# Patient Record
Sex: FEMALE | Race: WHITE | NOT HISPANIC OR LATINO
[De-identification: names, ages, dates, MRNs, and addresses within clinical notes are randomized per-mention and may not be internally consistent; named-entity substitution may affect disease eponyms.]

---

## 2021-07-06 PROBLEM — Z00.00 ENCOUNTER FOR PREVENTIVE HEALTH EXAMINATION: Status: ACTIVE | Noted: 2021-07-06

## 2021-07-09 ENCOUNTER — NON-APPOINTMENT (OUTPATIENT)
Age: 46
End: 2021-07-09

## 2021-07-09 ENCOUNTER — APPOINTMENT (OUTPATIENT)
Dept: PAIN MANAGEMENT | Facility: CLINIC | Age: 46
End: 2021-07-09
Payer: MEDICARE

## 2021-07-09 VITALS
SYSTOLIC BLOOD PRESSURE: 133 MMHG | HEART RATE: 80 BPM | HEIGHT: 57 IN | WEIGHT: 112 LBS | BODY MASS INDEX: 24.16 KG/M2 | DIASTOLIC BLOOD PRESSURE: 84 MMHG

## 2021-07-09 PROCEDURE — 99204 OFFICE O/P NEW MOD 45 MIN: CPT

## 2021-07-09 PROCEDURE — 99072 ADDL SUPL MATRL&STAF TM PHE: CPT

## 2021-07-09 NOTE — ASSESSMENT
[FreeTextEntry1] : I personally reviewed the relevant imaging.  Discussed and explained to patient the likely source of pathology and pain.  Questions answered.\par \par persistent pain secondary to lumbosacral stenosis and postlaminectomy p ain demonstrated on imaging refractory to conservative treatments, will schedule caudal epidural steroid injection r/b/a discussed\par \par informed patient of risks of steroid administration including transient worsening of blood glucose, htn, mood changes, progressive osteoporosis.  Encouraged to call with questions and concerns.\par \par Will schedule above interventional pain procedure because further delay may cause harm or negative outcome to patient.  The goal in performing this procedure is to avoid deterioration of function, emergency room visits (which increases exposure) and reliance on opioids.  \par \par r/b/a discussed with patient, lack of evidence to conclusively determine whether pain management procedures have any positive or negative impact on the possibility of wally the virus and/or development of any sequelae. \par \par Patient counselled regarding timing steroid based intervention 2 weeks before or after COVID-19 vaccine administration to avoid any interaction or affect on efficacy of vaccination\par \par Patient demonstrates understanding\par \par Informed patient that risks associated with the COVID-19 infection.  Informed patient steps taken to limit the risks.  We are implementing safety precautions and following protocols consistent with the CDC and state recommendations. All patients and staff will be checked for fever or signs of illness upon entry to the facility. We will limit our steroid dose to the lowest effective therapeutic dose or in some cases steroids will not be injected at all. \par \par Patient agrees to proceed\par \par May consider scs trial for postlaminectomy pain\par \par neurology - establish care for hx of neurofibromatosis\par \par The above diagnosis and treatment plan is medically reasonable and necessary based on the patient encounter.\par \par Regarding opiate medication to manage pain. I had a detailed discussion with the patient regarding the risks of long-term opioid use, including the potential for medication side effects, hyperaglesia, endocrine dysfunction,  Encouraged weaning with assistance of current prescriber.\par \par There were no barriers to communication.\par Informed patient that I would be available for any additional questions.\par Patient was instructed to call with any worsening symptoms including severe pain, new numbness/weakness, or changes in the bowel/bladder function. \par  \par Discussed role of nsaids in pain management and all relevant risks, if patient is continuing to require after 4 weeks the patient should f/u for alternative treatment. \par \par Instructed patient to maintain pain diary to monitor pain level, mobility, and function.\par \par The referring provider was informed of the above diagnosis and treatment plan.\par

## 2021-07-09 NOTE — REASON FOR VISIT
[Initial Consultation] : an initial pain management consultation [FreeTextEntry2] : referred by Dr. Nayak for evaluation of back pain

## 2021-07-09 NOTE — HISTORY OF PRESENT ILLNESS
[Back Pain] : back pain [___ yrs] : [unfilled] year(s) ago [7] : an average pain level of 7/10 [10] : a maximum pain level of 10/10 [Transitioning] : transitioning [Medications] : medications [FreeTextEntry1] : HPI\par \par Ms. MAURO SCHNEIDER is a 45 year F with pmhx of neurofibromatosis with neurofibroma sp laminectomy, decompression and fusion presents with bilateral lower back pain radiating to bilateral subchondral, and lower back pain radiating to left anterior thigh.  Pain is so bad that patient finds it difficult to perform adls and ambulate. denies any worsening numbness, weakness, bowel/bladder dysfunction.  \par \par \par Previous and current pain medications/doses/effects:\par \par percocet 5/325 \par Medical cannabis \par Tizanidine prn spasm\par cymbalta \par \par Previous Pain Treatments:\par \par PT without improvement\par \par Previous Pain Injections:\par \par \par Previous Diagnostic Studies/Images:\par \par \par MRI LS 3/20\par \par Patient status post laminectomies from T11-L2. Previous study \par  demonstrated screws extending from the left side through vertebral bodies at \par  T11 to L2. A plate connected the screws. That finding is again demonstrated. \par  Since the previous study, right sided pedicle screws have been placed at L2, \par  L3 and L4. Vertebral disc cages are now demonstrated at L2-L3 and L3-L4. There \par  is extensive metallic artifact from hardware. The artifact from the cage at \par  L3-L4 obscures the ventral aspect of the spinal canal. The artifact from the \par  pedicle screw or the pedicle screw at L3 appears to extend medial to the \par  pedicle into the right side of the spinal canal. Again demonstrated is grade 2 \par  retrolisthesis at L2-L3. \par \par  INTRADURAL SPACE: Again demonstrated is an ectatic spinal canal at all levels \par  from T11 to L3. Canal is wide in AP diameter as well as in width. Wide canal \par  expands neural foramina at multiple levels. Again demonstrated is erosion of \par  the dorsal aspect of the L3 vertebral body. Wide canal could represent \par  congenital dural ectasia/meningoceles and/or previous \par  surgery/pseudomeningoceles. Again demonstrated is an enhancing structure to \par  the right of the midline in the spinal canal at the T11-T12 level. On contrast \par  image 30 series 1001 of the current study, the enhancing structure measures 8 \par  mm in AP diameter by 7 mm in width. On sagittal T2-weighted image 12 series \par  501, the lesion measures 24 mm in height. The lesion has not significantly \par  changed since 2012. In view of history of neurofibromatosis indicated on the \par  report of previous study, the lesion is likely a neurofibroma. No new or \par  enlarging neurofibroma is demonstrated. The distal end of the spinal cord is \par  unremarkable. \par \par  FINDINGS AT SPECIFIC LEVELS: \par \par  L5-S1: Mild facet osteoarthritis. No bulge or herniation. No significant \par  change. \par \par  L4-L5: Mild facet osteoarthritis. No bulge or herniation. No significant \par  change. \par \par  L3-L4: As noted above, artifact from the cage appears to extend into the \par  spinal canal is possible that a small amount of the cage extends into the \par  spinal canal, but there is no evidence of disc bulge or herniation. \par \par  L2-L3: Again demonstrated is grade 2 retrolisthesis. There is mild central \par  stenosis without change. \par \par  L1 level: While the intervertebral discs at T12-L1 and L1-L2 do not \par  protrude into the spinal canal there is a bony protrusion of the superior and \par  inferior endplates of the L1 vertebral body into the spinal canal. That bony \par  protrusion was present on the previous study. However, there is now severe \par  spinal stenosis at the level of the inferior endplate of the L1 vertebral body \par  (compare sagittal images 4-9 series 501 of the current study to sagittal \par  images 7-12 series 601 of the previous study in 2012). At the level of the \par  superior endplate of L1, the right side of the thecal sac is now compressed, \par  but the left side is widely patent. \par \par  BONE MARROW SIGNAL: No neoplastic replacement of bone marrow. No evidence of \par  osteomyelitis/discitis. \par \par  ALIGNMENT: Again demonstrated is a thoracolumbar kyphoscoliosis. Again \par  demonstrated is second-degree retrolisthesis of L2 with respect to L3. \par \par  PARASPINAL SOFT TISSUES: There is no paraspinal mass. There is no paraspinal \par  neurofibroma. Small follicles in the right ovary are physiologic in a young \par  patient. There are small bilateral renal lesions that are not fully \par  characterized, but are consistent with cysts. \par \par \par  IMPRESSION: Status post additional surgery since previous study 2012. Artifact \par  from the right L3 pedicle screw or the actual pedicle screw appears to extend \par  into the spinal canal. Artifact from the disc cage at L3-L4 protrudes into the \par  spinal canal. It is possible that a small amount of the cage extends into the \par  spinal canal. \par \par  Interval development of severe spinal stenosis at the level of the inferior \par  endplate of L1. \par \par  No change in presumed neurofibroma in the right side of the spinal canal at \par  the T11-T12 level.

## 2021-07-09 NOTE — PHYSICAL EXAM
[Normal muscle bulk without asymmetry] : normal muscle bulk without asymmetry [Facet Tenderness] : facet tenderness [Spine: Flexion to ___ degrees, without pain] : spine: flexion to [unfilled] degrees, without pain [Normal] : Normal affect [de-identified] : Constitutional: Normal, well developed, no acute distress\par Eyes: Symmetric, External structures \par Oropharynx: Lips normal, symmetric, no external lesions appreciated\par Respiratory: Non-labored breathing, no audible wheezes\par Cardiac: Pulse palpated, no tachycardia\par Vascular: No cyanosis appreciated, no edema in bilateral lower extremities\par GI: Nondistended, no jaundice appreciated\par Neurovascular: CN2-12 grossly intact, Alert and oriented\par MSK: Normal muscle bulk, 5/5 Motor strength B/L in LE\par \par

## 2021-07-09 NOTE — CONSULT LETTER
[Dear  ___] : Dear  [unfilled], [Consult Letter:] : I had the pleasure of evaluating your patient, [unfilled]. [Please see my note below.] : Please see my note below. [Consult Closing:] : Thank you very much for allowing me to participate in the care of this patient.  If you have any questions, please do not hesitate to contact me. [Sincerely,] : Sincerely, [FreeTextEntry3] : \par Sangita Lacy DO, MBA\par Director, Pain Management Center\par  of Anesthesiology\par [facility] [employer] [facility]\par \par \par

## 2021-07-26 ENCOUNTER — RESULT REVIEW (OUTPATIENT)
Age: 46
End: 2021-07-26

## 2021-07-26 ENCOUNTER — APPOINTMENT (OUTPATIENT)
Dept: PAIN MANAGEMENT | Facility: HOSPITAL | Age: 46
End: 2021-07-26

## 2021-08-13 ENCOUNTER — APPOINTMENT (OUTPATIENT)
Dept: PAIN MANAGEMENT | Facility: CLINIC | Age: 46
End: 2021-08-13
Payer: MEDICARE

## 2021-08-13 PROCEDURE — 99214 OFFICE O/P EST MOD 30 MIN: CPT

## 2021-08-13 NOTE — ASSESSMENT
[FreeTextEntry1] : I personally reviewed the relevant imaging.  Discussed and explained to patient the likely source of pathology and pain.  Questions answered.\par \par persistent pain secondary to lumbosacral stenosis and postlaminectomy p ain demonstrated on imaging refractory to conservative treatments, sp caudal epidural steroid injection\par \par neuropathic pain - possible causalgia \par \par trial PT - referral provided\par \par may consider LSB for sympathetic pain \par \par lumbar spondylosis may consider mbb\par \par trial diclofenac for for breakthrough, recommend to take with meals\par \par I advised MAURO that the NSAID should be taken with food.  In addition while taking the prescribed NSAID, no over the counter or other NSAIDs should be used, such as ibuprofen (Motrin or Advil) or naproxen (Aleve) as this can cause stomach upset or other side effects.  If needed for fever or breakthrough pain Tylenol can be used.\par \par recommend \par May consider scs trial for postlaminectomy pain\par \par neurology - establish care for hx of neurofibromatosis\par \par The above diagnosis and treatment plan is medically reasonable and necessary based on the patient encounter.\par \par Regarding opiate medication to manage pain. I had a detailed discussion with the patient regarding the risks of long-term opioid use, including the potential for medication side effects, hyperaglesia, endocrine dysfunction,  Encouraged weaning with assistance of current prescriber.\par \par There were no barriers to communication.\par Informed patient that I would be available for any additional questions.\par Patient was instructed to call with any worsening symptoms including severe pain, new numbness/weakness, or changes in the bowel/bladder function. \par  \par Discussed role of nsaids in pain management and all relevant risks, if patient is continuing to require after 4 weeks the patient should f/u for alternative treatment. \par \par Instructed patient to maintain pain diary to monitor pain level, mobility, and function.\par \par The referring provider was informed of the above diagnosis and treatment plan.\par

## 2021-08-13 NOTE — HISTORY OF PRESENT ILLNESS
[Back Pain] : back pain [___ yrs] : [unfilled] year(s) ago [7] : an average pain level of 7/10 [10] : a maximum pain level of 10/10 [Transitioning] : transitioning [Medications] : medications [FreeTextEntry1] : Interval Note:\par sp  caudal epidural steroid injection 7/26/21 with improvement in pain.  Patien complain of break through lower back pain at times still requiring oxycodone.  She also reports continued numbness over the right thigh with hyperalgesia\par Since last visit the pain is improved. Denies any additional weakness, numbness, bowel/bladder dysfunction.  Pain intensity is\par \par \par HPI\par \par Ms. MAURO SCHNEIDER is a 45 year F with pmhx of neurofibromatosis with neurofibroma sp laminectomy, decompression and fusion presents with bilateral lower back pain radiating to bilateral subchondral, and lower back pain radiating to left anterior thigh.  Pain is so bad that patient finds it difficult to perform adls and ambulate. denies any worsening numbness, weakness, bowel/bladder dysfunction.  \par \par Sensory  painful to touch\par \par Vasomotor  warmer over painful area\par \par Sudomotor/oedema  - edema\par \par Motor/trophic  decreased ROM\par \par Previous and current pain medications/doses/effects:\par \par percocet 5/325 \par Medical cannabis \par Tizanidine prn spasm\par cymbalta \par \par Previous Pain Treatments:\par \par PT without improvement\par \par Previous Pain Injections:\par \par   caudal epidural steroid injection 7/26/21\par \par Previous Diagnostic Studies/Images:\par \par \par MRI LS 3/20\par \par Patient status post laminectomies from T11-L2. Previous study \par  demonstrated screws extending from the left side through vertebral bodies at \par  T11 to L2. A plate connected the screws. That finding is again demonstrated. \par  Since the previous study, right sided pedicle screws have been placed at L2, \par  L3 and L4. Vertebral disc cages are now demonstrated at L2-L3 and L3-L4. There \par  is extensive metallic artifact from hardware. The artifact from the cage at \par  L3-L4 obscures the ventral aspect of the spinal canal. The artifact from the \par  pedicle screw or the pedicle screw at L3 appears to extend medial to the \par  pedicle into the right side of the spinal canal. Again demonstrated is grade 2 \par  retrolisthesis at L2-L3. \par \par  INTRADURAL SPACE: Again demonstrated is an ectatic spinal canal at all levels \par  from T11 to L3. Canal is wide in AP diameter as well as in width. Wide canal \par  expands neural foramina at multiple levels. Again demonstrated is erosion of \par  the dorsal aspect of the L3 vertebral body. Wide canal could represent \par  congenital dural ectasia/meningoceles and/or previous \par  surgery/pseudomeningoceles. Again demonstrated is an enhancing structure to \par  the right of the midline in the spinal canal at the T11-T12 level. On contrast \par  image 30 series 1001 of the current study, the enhancing structure measures 8 \par  mm in AP diameter by 7 mm in width. On sagittal T2-weighted image 12 series \par  501, the lesion measures 24 mm in height. The lesion has not significantly \par  changed since 2012. In view of history of neurofibromatosis indicated on the \par  report of previous study, the lesion is likely a neurofibroma. No new or \par  enlarging neurofibroma is demonstrated. The distal end of the spinal cord is \par  unremarkable. \par \par  FINDINGS AT SPECIFIC LEVELS: \par \par  L5-S1: Mild facet osteoarthritis. No bulge or herniation. No significant \par  change. \par \par  L4-L5: Mild facet osteoarthritis. No bulge or herniation. No significant \par  change. \par \par  L3-L4: As noted above, artifact from the cage appears to extend into the \par  spinal canal is possible that a small amount of the cage extends into the \par  spinal canal, but there is no evidence of disc bulge or herniation. \par \par  L2-L3: Again demonstrated is grade 2 retrolisthesis. There is mild central \par  stenosis without change. \par \par  L1 level: While the intervertebral discs at T12-L1 and L1-L2 do not \par  protrude into the spinal canal there is a bony protrusion of the superior and \par  inferior endplates of the L1 vertebral body into the spinal canal. That bony \par  protrusion was present on the previous study. However, there is now severe \par  spinal stenosis at the level of the inferior endplate of the L1 vertebral body \par  (compare sagittal images 4-9 series 501 of the current study to sagittal \par  images 7-12 series 601 of the previous study in 2012). At the level of the \par  superior endplate of L1, the right side of the thecal sac is now compressed, \par  but the left side is widely patent. \par \par  BONE MARROW SIGNAL: No neoplastic replacement of bone marrow. No evidence of \par  osteomyelitis/discitis. \par \par  ALIGNMENT: Again demonstrated is a thoracolumbar kyphoscoliosis. Again \par  demonstrated is second-degree retrolisthesis of L2 with respect to L3. \par \par  PARASPINAL SOFT TISSUES: There is no paraspinal mass. There is no paraspinal \par  neurofibroma. Small follicles in the right ovary are physiologic in a young \par  patient. There are small bilateral renal lesions that are not fully \par  characterized, but are consistent with cysts. \par \par \par  IMPRESSION: Status post additional surgery since previous study 2012. Artifact \par  from the right L3 pedicle screw or the actual pedicle screw appears to extend \par  into the spinal canal. Artifact from the disc cage at L3-L4 protrudes into the \par  spinal canal. It is possible that a small amount of the cage extends into the \par  spinal canal. \par \par  Interval development of severe spinal stenosis at the level of the inferior \par  endplate of L1. \par \par  No change in presumed neurofibroma in the right side of the spinal canal at \par  the T11-T12 level.

## 2021-09-01 ENCOUNTER — RX RENEWAL (OUTPATIENT)
Age: 46
End: 2021-09-01

## 2021-09-22 ENCOUNTER — APPOINTMENT (OUTPATIENT)
Dept: PAIN MANAGEMENT | Facility: CLINIC | Age: 46
End: 2021-09-22
Payer: MEDICARE

## 2021-09-22 VITALS
BODY MASS INDEX: 22.65 KG/M2 | SYSTOLIC BLOOD PRESSURE: 135 MMHG | HEIGHT: 57 IN | TEMPERATURE: 98.2 F | WEIGHT: 105 LBS | DIASTOLIC BLOOD PRESSURE: 90 MMHG

## 2021-09-22 PROCEDURE — 99214 OFFICE O/P EST MOD 30 MIN: CPT

## 2021-10-07 ENCOUNTER — RESULT REVIEW (OUTPATIENT)
Age: 46
End: 2021-10-07

## 2021-10-07 ENCOUNTER — APPOINTMENT (OUTPATIENT)
Dept: PAIN MANAGEMENT | Facility: HOSPITAL | Age: 46
End: 2021-10-07

## 2021-10-07 NOTE — HISTORY OF PRESENT ILLNESS
[9] : 3. What number best describes how, during the past week, pain has interfered with your general activity? 9/10 pain [Back Pain] : back pain [___ yrs] : [unfilled] year(s) ago [7] : an average pain level of 7/10 [10] : a maximum pain level of 10/10 [Transitioning] : transitioning [Medications] : medications [FreeTextEntry1] : Interval Note:\par \par sp  caudal epidural steroid injection 7/26/21 with improvement in pain. Patient reports that she is no longer on oxycodone, however she is having breakthrough right axial pain, worse with transition.  Pain is so bad that patient finds it difficult to perform adls and ambulate. \par Denies any additional weakness, numbness, bowel/bladder dysfunction. \par \par \par HPI\par \par Ms. MAURO SCHNEIDER is a 46 year F with pmhx of neurofibromatosis with neurofibroma sp laminectomy, decompression and fusion presents with bilateral lower back pain radiating to bilateral subchondral, and lower back pain radiating to left anterior thigh.  Pain is so bad that patient finds it difficult to perform adls and ambulate. denies any worsening numbness, weakness, bowel/bladder dysfunction.  \par \par Sensory  painful to touch\par \par Vasomotor  warmer over painful area\par \par Sudomotor/oedema  - edema\par \par Motor/trophic  decreased ROM\par \par Previous and current pain medications/doses/effects:\par \par percocet 5/325 \par Medical cannabis \par Tizanidine prn spasm\par cymbalta \par \par Previous Pain Treatments:\par \par PT without improvement\par \par Previous Pain Injections:\par \par   caudal epidural steroid injection 7/26/21\par \par Previous Diagnostic Studies/Images:\par \par \par MRI LS 3/20\par \par Patient status post laminectomies from T11-L2. Previous study \par  demonstrated screws extending from the left side through vertebral bodies at \par  T11 to L2. A plate connected the screws. That finding is again demonstrated. \par  Since the previous study, right sided pedicle screws have been placed at L2, \par  L3 and L4. Vertebral disc cages are now demonstrated at L2-L3 and L3-L4. There \par  is extensive metallic artifact from hardware. The artifact from the cage at \par  L3-L4 obscures the ventral aspect of the spinal canal. The artifact from the \par  pedicle screw or the pedicle screw at L3 appears to extend medial to the \par  pedicle into the right side of the spinal canal. Again demonstrated is grade 2 \par  retrolisthesis at L2-L3. \par \par  INTRADURAL SPACE: Again demonstrated is an ectatic spinal canal at all levels \par  from T11 to L3. Canal is wide in AP diameter as well as in width. Wide canal \par  expands neural foramina at multiple levels. Again demonstrated is erosion of \par  the dorsal aspect of the L3 vertebral body. Wide canal could represent \par  congenital dural ectasia/meningoceles and/or previous \par  surgery/pseudomeningoceles. Again demonstrated is an enhancing structure to \par  the right of the midline in the spinal canal at the T11-T12 level. On contrast \par  image 30 series 1001 of the current study, the enhancing structure measures 8 \par  mm in AP diameter by 7 mm in width. On sagittal T2-weighted image 12 series \par  501, the lesion measures 24 mm in height. The lesion has not significantly \par  changed since 2012. In view of history of neurofibromatosis indicated on the \par  report of previous study, the lesion is likely a neurofibroma. No new or \par  enlarging neurofibroma is demonstrated. The distal end of the spinal cord is \par  unremarkable. \par \par  FINDINGS AT SPECIFIC LEVELS: \par \par  L5-S1: Mild facet osteoarthritis. No bulge or herniation. No significant \par  change. \par \par  L4-L5: Mild facet osteoarthritis. No bulge or herniation. No significant \par  change. \par \par  L3-L4: As noted above, artifact from the cage appears to extend into the \par  spinal canal is possible that a small amount of the cage extends into the \par  spinal canal, but there is no evidence of disc bulge or herniation. \par \par  L2-L3: Again demonstrated is grade 2 retrolisthesis. There is mild central \par  stenosis without change. \par \par  L1 level: While the intervertebral discs at T12-L1 and L1-L2 do not \par  protrude into the spinal canal there is a bony protrusion of the superior and \par  inferior endplates of the L1 vertebral body into the spinal canal. That bony \par  protrusion was present on the previous study. However, there is now severe \par  spinal stenosis at the level of the inferior endplate of the L1 vertebral body \par  (compare sagittal images 4-9 series 501 of the current study to sagittal \par  images 7-12 series 601 of the previous study in 2012). At the level of the \par  superior endplate of L1, the right side of the thecal sac is now compressed, \par  but the left side is widely patent. \par \par  BONE MARROW SIGNAL: No neoplastic replacement of bone marrow. No evidence of \par  osteomyelitis/discitis. \par \par  ALIGNMENT: Again demonstrated is a thoracolumbar kyphoscoliosis. Again \par  demonstrated is second-degree retrolisthesis of L2 with respect to L3. \par \par  PARASPINAL SOFT TISSUES: There is no paraspinal mass. There is no paraspinal \par  neurofibroma. Small follicles in the right ovary are physiologic in a young \par  patient. There are small bilateral renal lesions that are not fully \par  characterized, but are consistent with cysts. \par \par \par  IMPRESSION: Status post additional surgery since previous study 2012. Artifact \par  from the right L3 pedicle screw or the actual pedicle screw appears to extend \par  into the spinal canal. Artifact from the disc cage at L3-L4 protrudes into the \par  spinal canal. It is possible that a small amount of the cage extends into the \par  spinal canal. \par \par  Interval development of severe spinal stenosis at the level of the inferior \par  endplate of L1. \par \par  No change in presumed neurofibroma in the right side of the spinal canal at \par  the T11-T12 level.  [FreeTextEntry2] : 27

## 2021-10-07 NOTE — ASSESSMENT
[FreeTextEntry1] : >> Imaging and Other Studies\par \par I personally reviewed the relevant imaging.  Discussed and explained to patient the likely source of pathology and pain.  Questions answered. MRI\par \par >> Therapy and Other Modalities\par \par PT - referral provided\par \par >> Medications\par  \par trial diclofenac for for breakthrough, recommend to take with meals\par \par I advised MAURO that the NSAID should be taken with food.  In addition while taking the prescribed NSAID, no over the counter or other NSAIDs should be used, such as ibuprofen (Motrin or Advil) or naproxen (Aleve) as this can cause stomach upset or other side effects.  If needed for fever or breakthrough pain Tylenol can be used.\par \par Regarding opiate medication to manage pain. I had a detailed discussion with the patient regarding the risks of long-term opioid use, including the potential for medication side effects, hyperaglesia, endocrine dysfunction,  Encouraged weaning with assistance of current prescriber.\par \par >> Interventions\par \par persistent pain secondary to lumbosacral stenosis and postlaminectomy p ain demonstrated on imaging refractory to conservative treatments, sp caudal epidural steroid injection\par \par neuropathic pain - possible causalgia \par \par may consider LSB for sympathetic pain \par \par Significant component of axial back pain secondary to lumbar spondylosis and facet arthropathy demonstrated on MRI LS.  Pain refractory to conservative treatments.  Will schedule BILATERAL L4-sacral ala diagnostic medial branch block (2 joints, 3 nerves on each side) r/b/a discussed\par \par May consider radiofreqency ablation\par \par May consider scs trial for postlaminectomy pain\par \par >> Consults\par \par neurology - establish care for hx of neurofibromatosis\par \par \par >> Discussion of Risks/Benefits/Alternatives\par \par \par \par 	>Regarding any scheduled procedures:\par \par I have discussed in detail with the patient that any interventional pain procedure is associated with potential risks.  The procedure may include an injection of steroids and potentially other medications (local anesthetic and normal saline) into the epidural space or surrounding tissue of the spine.  There are significant risks of this procedure which include and are not limited to infection, bleeding, worsening pain, dural puncture leading to postdural puncture headache, nerve damage, spinal cord injury, paralysis, stroke, and death.  \par \par There is a chance that the procedure does not improve their pain.  \par \par There are risks associated with the steroid being absorbed into the body systemically.  These include dysphoria, difficulty sleeping, mood swings and personality changes.  Premenopausal women may notice an irregularity in her menstrual cycle for 2-3 months following the injection.  Steroids can specifically affect patients with hypertension, diabetes, and peptic ulcers.  The procedure may cause a temporary increase in blood pressure and blood pressure, and may adversely affect a peptic ulcer.  Other, more rare complications, include avascular necrosis of joints, glaucoma and worsening of osteoporosis. \par \par I have discussed the risks of the procedure at length with the patient, and the potential benefits of pain relief.  I have offered alternatives to the procedure.  All questions were answered.  \par \par The patient expressed understanding and wishes to proceed with the procedure.\par \par 	>Regardin COVID19 Pandemic: \par \par Any planned interventional pain procedure are scheduled because further delay may cause harm or negative outcome to patient.  The goal in performing this procedure is to avoid deterioration of function, emergency room visits (which increases exposure) and reliance on opioids.  \par \par r/b/a discussed with patient, lack of evidence to conclusively determine whether pain management procedures have any positive or negative impact on the possibility of wally the virus and/or development of any sequelae. \par \par Patient counselled regarding timing steroid based intervention 2 weeks before or after COVID-19 vaccine administration to avoid any interaction or affect on efficacy of vaccination\par \par Patient demonstrates understanding\par \par Informed patient that risks associated with the COVID-19 infection.  Informed patient steps taken to limit the risks.  We are implementing safety precautions and following protocols consistent with the CDC and state recommendations. All patients and staff will be checked for fever or signs of illness upon entry to the facility. We will limit our steroid dose to the lowest effective therapeutic dose or in some cases steroids will not be injected at all. \par \par Patient agrees to proceed\par \par \par >> Conclusion\par \par \par The above diagnosis and treatment plan is medically reasonable and necessary based on the patient encounter \par There were no barriers to communication.\par Informed patient that I would be available for any additional questions.\par Patient was instructed to call with any worsening symptoms including severe pain, new numbness/weakness, or changes in the bowel/bladder function. \par Discussed role of nsaids in pain management and all relevant risks, if patient is continuing to require after 4 weeks the patient should f/u for alternative treatment. \par Instructed patient to maintain pain diary to monitor pain level, mobility, and function.\par \par

## 2021-10-22 ENCOUNTER — APPOINTMENT (OUTPATIENT)
Dept: PAIN MANAGEMENT | Facility: CLINIC | Age: 46
End: 2021-10-22
Payer: MEDICARE

## 2021-10-22 VITALS
HEIGHT: 57 IN | TEMPERATURE: 98 F | WEIGHT: 105 LBS | DIASTOLIC BLOOD PRESSURE: 79 MMHG | SYSTOLIC BLOOD PRESSURE: 111 MMHG | BODY MASS INDEX: 22.65 KG/M2

## 2021-10-22 PROCEDURE — 99214 OFFICE O/P EST MOD 30 MIN: CPT

## 2021-10-22 NOTE — ASSESSMENT
[FreeTextEntry1] : >> Imaging and Other Studies\par \par I personally reviewed the relevant imaging.  Discussed and explained to patient the likely source of pathology and pain.  Questions answered. MRI\par \par >> Therapy and Other Modalities\par \par PT - referral provided\par \par >> Medications\par  \par trial diclofenac for for breakthrough, recommend to take with meals\par \par I advised MAURO that the NSAID should be taken with food.  In addition while taking the prescribed NSAID, no over the counter or other NSAIDs should be used, such as ibuprofen (Motrin or Advil) or naproxen (Aleve) as this can cause stomach upset or other side effects.  If needed for fever or breakthrough pain Tylenol can be used.\par \par Regarding opiate medication to manage pain. I had a detailed discussion with the patient regarding the risks of long-term opioid use, including the potential for medication side effects, hyperaglesia, endocrine dysfunction,  Encouraged weaning with assistance of current prescriber.\par \par >> Interventions\par \par persistent pain secondary to lumbosacral stenosis and postlaminectomy p ain demonstrated on imaging refractory to conservative treatments, sp caudal epidural steroid injection\par \par neuropathic pain - possible causalgia \par \par may consider LSB for sympathetic pain \par \par Significant component of axial back pain secondary to lumbar spondylosis and facet arthropathy demonstrated on MRI LS.  Pain refractory to conservative treatments.  sp BILATERAL L4-sacral ala diagnostic medial branch block (2 joints, 3 nerves on each side)\par \par given efficacy of previous intervention that is >30% improvement in pain and improved ability to perform adls, and return of pain despite conservative treatment, will schedule repeat BILATERAL L4-sacral ala diagnostic medial branch block (2 joints, 3 nerves on each side) r/b/a discussed\par \par \par May consider radiofreqency ablation\par \par May consider scs trial for postlaminectomy pain\par \par >> Consults\par \par neurology - establish care for hx of neurofibromatosis\par patient will consult with PMD\par \par >> Discussion of Risks/Benefits/Alternatives\par \par \par \par 	>Regarding any scheduled procedures:\par \par I have discussed in detail with the patient that any interventional pain procedure is associated with potential risks.  The procedure may include an injection of steroids and potentially other medications (local anesthetic and normal saline) into the epidural space or surrounding tissue of the spine.  There are significant risks of this procedure which include and are not limited to infection, bleeding, worsening pain, dural puncture leading to postdural puncture headache, nerve damage, spinal cord injury, paralysis, stroke, and death.  \par \par There is a chance that the procedure does not improve their pain.  \par \par There are risks associated with the steroid being absorbed into the body systemically.  These include dysphoria, difficulty sleeping, mood swings and personality changes.  Premenopausal women may notice an irregularity in her menstrual cycle for 2-3 months following the injection.  Steroids can specifically affect patients with hypertension, diabetes, and peptic ulcers.  The procedure may cause a temporary increase in blood pressure and blood pressure, and may adversely affect a peptic ulcer.  Other, more rare complications, include avascular necrosis of joints, glaucoma and worsening of osteoporosis. \par \par I have discussed the risks of the procedure at length with the patient, and the potential benefits of pain relief.  I have offered alternatives to the procedure.  All questions were answered.  \par \par The patient expressed understanding and wishes to proceed with the procedure.\par \par 	>Regardin COVID19 Pandemic: \par \par Any planned interventional pain procedure are scheduled because further delay may cause harm or negative outcome to patient.  The goal in performing this procedure is to avoid deterioration of function, emergency room visits (which increases exposure) and reliance on opioids.  \par \par r/b/a discussed with patient, lack of evidence to conclusively determine whether pain management procedures have any positive or negative impact on the possibility of wally the virus and/or development of any sequelae. \par \par Patient counselled regarding timing steroid based intervention 2 weeks before or after COVID-19 vaccine administration to avoid any interaction or affect on efficacy of vaccination\par \par Patient demonstrates understanding\par \par Informed patient that risks associated with the COVID-19 infection.  Informed patient steps taken to limit the risks.  We are implementing safety precautions and following protocols consistent with the CDC and state recommendations. All patients and staff will be checked for fever or signs of illness upon entry to the facility. We will limit our steroid dose to the lowest effective therapeutic dose or in some cases steroids will not be injected at all. \par \par Patient agrees to proceed\par \par \par >> Conclusion\par \par \par The above diagnosis and treatment plan is medically reasonable and necessary based on the patient encounter \par There were no barriers to communication.\par Informed patient that I would be available for any additional questions.\par Patient was instructed to call with any worsening symptoms including severe pain, new numbness/weakness, or changes in the bowel/bladder function. \par Discussed role of nsaids in pain management and all relevant risks, if patient is continuing to require after 4 weeks the patient should f/u for alternative treatment. \par Instructed patient to maintain pain diary to monitor pain level, mobility, and function.\par \par

## 2021-10-22 NOTE — PHYSICAL EXAM
[Normal muscle bulk without asymmetry] : normal muscle bulk without asymmetry [Facet Tenderness] : facet tenderness [Spine: Flexion to ___ degrees, without pain] : spine: flexion to [unfilled] degrees, without pain [Normal] : Normal affect

## 2021-10-22 NOTE — HISTORY OF PRESENT ILLNESS
[Back Pain] : back pain [___ yrs] : [unfilled] year(s) ago [7] : an average pain level of 7/10 [10] : a maximum pain level of 10/10 [Transitioning] : transitioning [Medications] : medications [FreeTextEntry1] : Interval Note:\par sp BILATERAL L4-sacral ala diagnostic medial branch block 10/7/21 with 100% improvement in axial back pain for 24 hours and significant improvement in function. \par  she is having breakthrough right axial pain, worse with transition.  Pain is so bad that patient finds it difficult to perform adls and ambulate. \par Denies any additional weakness, numbness, bowel/bladder dysfunction. \par \par \par HPI\par \par Ms. MAURO SCHNEIDER is a 46 year F with pmhx of neurofibromatosis with neurofibroma sp laminectomy, decompression and fusion presents with bilateral lower back pain radiating to bilateral subchondral, and lower back pain radiating to left anterior thigh.  Pain is so bad that patient finds it difficult to perform adls and ambulate. denies any worsening numbness, weakness, bowel/bladder dysfunction.  \par \par Sensory  painful to touch\par \par Vasomotor  warmer over painful area\par \par Sudomotor/oedema  - edema\par \par Motor/trophic  decreased ROM\par \par Previous and current pain medications/doses/effects:\par \par percocet 5/325 \par Medical cannabis \par Tizanidine prn spasm\par cymbalta \par \par Previous Pain Treatments:\par \par PT without improvement\par \par Previous Pain Injections:\par BILATERAL L4-sacral ala diagnostic medial branch block 10/7/21\par   caudal epidural steroid injection 7/26/21\par \par Previous Diagnostic Studies/Images:\par \par \par MRI LS 3/20\par \par Patient status post laminectomies from T11-L2. Previous study \par  demonstrated screws extending from the left side through vertebral bodies at \par  T11 to L2. A plate connected the screws. That finding is again demonstrated. \par  Since the previous study, right sided pedicle screws have been placed at L2, \par  L3 and L4. Vertebral disc cages are now demonstrated at L2-L3 and L3-L4. There \par  is extensive metallic artifact from hardware. The artifact from the cage at \par  L3-L4 obscures the ventral aspect of the spinal canal. The artifact from the \par  pedicle screw or the pedicle screw at L3 appears to extend medial to the \par  pedicle into the right side of the spinal canal. Again demonstrated is grade 2 \par  retrolisthesis at L2-L3. \par \par  INTRADURAL SPACE: Again demonstrated is an ectatic spinal canal at all levels \par  from T11 to L3. Canal is wide in AP diameter as well as in width. Wide canal \par  expands neural foramina at multiple levels. Again demonstrated is erosion of \par  the dorsal aspect of the L3 vertebral body. Wide canal could represent \par  congenital dural ectasia/meningoceles and/or previous \par  surgery/pseudomeningoceles. Again demonstrated is an enhancing structure to \par  the right of the midline in the spinal canal at the T11-T12 level. On contrast \par  image 30 series 1001 of the current study, the enhancing structure measures 8 \par  mm in AP diameter by 7 mm in width. On sagittal T2-weighted image 12 series \par  501, the lesion measures 24 mm in height. The lesion has not significantly \par  changed since 2012. In view of history of neurofibromatosis indicated on the \par  report of previous study, the lesion is likely a neurofibroma. No new or \par  enlarging neurofibroma is demonstrated. The distal end of the spinal cord is \par  unremarkable. \par \par  FINDINGS AT SPECIFIC LEVELS: \par \par  L5-S1: Mild facet osteoarthritis. No bulge or herniation. No significant \par  change. \par \par  L4-L5: Mild facet osteoarthritis. No bulge or herniation. No significant \par  change. \par \par  L3-L4: As noted above, artifact from the cage appears to extend into the \par  spinal canal is possible that a small amount of the cage extends into the \par  spinal canal, but there is no evidence of disc bulge or herniation. \par \par  L2-L3: Again demonstrated is grade 2 retrolisthesis. There is mild central \par  stenosis without change. \par \par  L1 level: While the intervertebral discs at T12-L1 and L1-L2 do not \par  protrude into the spinal canal there is a bony protrusion of the superior and \par  inferior endplates of the L1 vertebral body into the spinal canal. That bony \par  protrusion was present on the previous study. However, there is now severe \par  spinal stenosis at the level of the inferior endplate of the L1 vertebral body \par  (compare sagittal images 4-9 series 501 of the current study to sagittal \par  images 7-12 series 601 of the previous study in 2012). At the level of the \par  superior endplate of L1, the right side of the thecal sac is now compressed, \par  but the left side is widely patent. \par \par  BONE MARROW SIGNAL: No neoplastic replacement of bone marrow. No evidence of \par  osteomyelitis/discitis. \par \par  ALIGNMENT: Again demonstrated is a thoracolumbar kyphoscoliosis. Again \par  demonstrated is second-degree retrolisthesis of L2 with respect to L3. \par \par  PARASPINAL SOFT TISSUES: There is no paraspinal mass. There is no paraspinal \par  neurofibroma. Small follicles in the right ovary are physiologic in a young \par  patient. There are small bilateral renal lesions that are not fully \par  characterized, but are consistent with cysts. \par \par \par  IMPRESSION: Status post additional surgery since previous study 2012. Artifact \par  from the right L3 pedicle screw or the actual pedicle screw appears to extend \par  into the spinal canal. Artifact from the disc cage at L3-L4 protrudes into the \par  spinal canal. It is possible that a small amount of the cage extends into the \par  spinal canal. \par \par  Interval development of severe spinal stenosis at the level of the inferior \par  endplate of L1. \par \par  No change in presumed neurofibroma in the right side of the spinal canal at \par  the T11-T12 level.  [FreeTextEntry2] : 21

## 2021-11-08 ENCOUNTER — RESULT REVIEW (OUTPATIENT)
Age: 46
End: 2021-11-08

## 2021-11-11 ENCOUNTER — RESULT REVIEW (OUTPATIENT)
Age: 46
End: 2021-11-11

## 2021-11-11 ENCOUNTER — APPOINTMENT (OUTPATIENT)
Dept: PAIN MANAGEMENT | Facility: HOSPITAL | Age: 46
End: 2021-11-11

## 2021-11-12 ENCOUNTER — APPOINTMENT (OUTPATIENT)
Dept: PAIN MANAGEMENT | Facility: CLINIC | Age: 46
End: 2021-11-12
Payer: MEDICARE

## 2021-11-12 PROCEDURE — 99212 OFFICE O/P EST SF 10 MIN: CPT | Mod: 95

## 2021-11-12 NOTE — ASSESSMENT
[FreeTextEntry1] : >> Imaging and Other Studies\par \par I personally reviewed the relevant imaging.  Discussed and explained to patient the likely source of pathology and pain.  Questions answered. MRI\par \par >> Therapy and Other Modalities\par \par PT - referral provided\par \par >> Medications\par  \par trial diclofenac for for breakthrough, recommend to take with meals\par \par I advised MAURO that the NSAID should be taken with food.  In addition while taking the prescribed NSAID, no over the counter or other NSAIDs should be used, such as ibuprofen (Motrin or Advil) or naproxen (Aleve) as this can cause stomach upset or other side effects.  If needed for fever or breakthrough pain Tylenol can be used.\par \par Regarding opiate medication to manage pain. I had a detailed discussion with the patient regarding the risks of long-term opioid use, including the potential for medication side effects, hyperaglesia, endocrine dysfunction,  Encouraged weaning with assistance of current prescriber.\par \par >> Interventions\par \par persistent pain secondary to lumbosacral stenosis and postlaminectomy p ain demonstrated on imaging refractory to conservative treatments, sp caudal epidural steroid injection\par \par neuropathic pain - possible causalgia \par \par may consider LSB for sympathetic pain \par \par Significant component of axial back pain secondary to lumbar spondylosis and facet arthropathy demonstrated on MRI LS.  Pain refractory to conservative treatments.  sp BILATERAL L4-sacral ala diagnostic medial branch block (2 joints, 3 nerves on each side) x 2\par \par Given significant relief from diagnostic lumbar medial branch block of >80%, and significant improvement in function (as measured by SANJEEV) and continued lumbar facet arthropathy pain (demonstrated on imaging) and axial back pain, will schedule RIGHT then LEFT  L4-sacral ala medial branch (2 joints, 3 nerves on each side) radiofrequency ablation at 80C for 2:30 min r/b/a discussed\par \par \par \par May consider scs trial for postlaminectomy pain\par \par >> Consults\par \par neurology - establish care for hx of neurofibromatosis\par patient will consult with PMD\par \par >> Discussion of Risks/Benefits/Alternatives\par \par \par \par 	>Regarding any scheduled procedures:\par \par I have discussed in detail with the patient that any interventional pain procedure is associated with potential risks.  The procedure may include an injection of steroids and potentially other medications (local anesthetic and normal saline) into the epidural space or surrounding tissue of the spine.  There are significant risks of this procedure which include and are not limited to infection, bleeding, worsening pain, dural puncture leading to postdural puncture headache, nerve damage, spinal cord injury, paralysis, stroke, and death.  \par \par There is a chance that the procedure does not improve their pain.  \par \par There are risks associated with the steroid being absorbed into the body systemically.  These include dysphoria, difficulty sleeping, mood swings and personality changes.  Premenopausal women may notice an irregularity in her menstrual cycle for 2-3 months following the injection.  Steroids can specifically affect patients with hypertension, diabetes, and peptic ulcers.  The procedure may cause a temporary increase in blood pressure and blood pressure, and may adversely affect a peptic ulcer.  Other, more rare complications, include avascular necrosis of joints, glaucoma and worsening of osteoporosis. \par \par I have discussed the risks of the procedure at length with the patient, and the potential benefits of pain relief.  I have offered alternatives to the procedure.  All questions were answered.  \par \par The patient expressed understanding and wishes to proceed with the procedure.\par \par 	>Regardin COVID19 Pandemic: \par \par Any planned interventional pain procedure are scheduled because further delay may cause harm or negative outcome to patient.  The goal in performing this procedure is to avoid deterioration of function, emergency room visits (which increases exposure) and reliance on opioids.  \par \par r/b/a discussed with patient, lack of evidence to conclusively determine whether pain management procedures have any positive or negative impact on the possibility of wally the virus and/or development of any sequelae. \par \par Patient counselled regarding timing steroid based intervention 2 weeks before or after COVID-19 vaccine administration to avoid any interaction or affect on efficacy of vaccination\par \par Patient demonstrates understanding\par \par Informed patient that risks associated with the COVID-19 infection.  Informed patient steps taken to limit the risks.  We are implementing safety precautions and following protocols consistent with the CDC and state recommendations. All patients and staff will be checked for fever or signs of illness upon entry to the facility. We will limit our steroid dose to the lowest effective therapeutic dose or in some cases steroids will not be injected at all. \par \par Patient agrees to proceed\par \par \par >> Conclusion\par \par \par The above diagnosis and treatment plan is medically reasonable and necessary based on the patient encounter \par There were no barriers to communication.\par Informed patient that I would be available for any additional questions.\par Patient was instructed to call with any worsening symptoms including severe pain, new numbness/weakness, or changes in the bowel/bladder function. \par Discussed role of nsaids in pain management and all relevant risks, if patient is continuing to require after 4 weeks the patient should f/u for alternative treatment. \par Instructed patient to maintain pain diary to monitor pain level, mobility, and function.\par \par I explained to patient benefits and limitation of TeleMedicine visits\par \par Patient understands that limitations include inability to perform comprehensive physical exam, which may lead to potential diagnostic inconsistencies.  \par \par Any scheduled procedures are based on history, imaging and limited physical exam performed on TeleHealth visit.  If necessary, additional focal physical exam will be performed on date of procedure\par \par Patient understands that diagnosis and treatment may be limited by these inconsistencies and patient agrees to proceed with care plan\par \par \par

## 2021-11-12 NOTE — PHYSICAL EXAM
[de-identified] : \par Constitutional: Normal, well developed, no acute distress on audio/video examination\par Eyes: Symmetric, External structures on video examination\par ENT: Lips, mucosa and tongue normal on video examination\par Oropharynx: Lips normal, symmetric, no external lesions appreciated appreciated on video examination\par Respiratory: Non-labored breathing, no audible wheezes appreciated on audio/video examination\par Vascular: No cyanosis appreciated or edema appreciated on video examination\par GI:  no jaundice appreciated on video examination\par Neurovascular: CN grossly intact on video/audio examination, alert\par MSK: Normal muscle bulk on video examination\par

## 2021-11-12 NOTE — HISTORY OF PRESENT ILLNESS
[Home] : at home, [unfilled] , at the time of the visit. [Medical Office: ([facility])___] : at the medical office located in  [Verbal consent obtained from patient] : the patient, [unfilled] [Back Pain] : back pain [___ yrs] : [unfilled] year(s) ago [7] : an average pain level of 7/10 [10] : a maximum pain level of 10/10 [Transitioning] : transitioning [Medications] : medications [FreeTextEntry1] : Interval Note:\par \par sp BILATERAL L4-sacral ala diagnostic medial branch block 10/7/21 with 100% improvement in axial back pain for 24 hours and significant improvement in function. \par  she is having breakthrough right axial pain, worse with transition.  \par Denies any additional weakness, numbness, bowel/bladder dysfunction. \par \par \par HPI\par \par Ms. MAURO SCHNEIDER is a 46 year F with pmhx of neurofibromatosis with neurofibroma sp laminectomy, decompression and fusion presents with bilateral lower back pain radiating to bilateral subchondral, and lower back pain radiating to left anterior thigh.  Pain is so bad that patient finds it difficult to perform adls and ambulate. denies any worsening numbness, weakness, bowel/bladder dysfunction.  \par \par Sensory  painful to touch\par \par Vasomotor  warmer over painful area\par \par Sudomotor/oedema  - edema\par \par Motor/trophic  decreased ROM\par \par Previous and current pain medications/doses/effects:\par \par percocet 5/325 \par Medical cannabis \par Tizanidine prn spasm\par cymbalta \par \par Previous Pain Treatments:\par \par PT without improvement\par \par Previous Pain Injections:\par BILATERAL L4-sacral ala diagnostic medial branch block 10/7/21\par   caudal epidural steroid injection 7/26/21\par \par Previous Diagnostic Studies/Images:\par \par \par MRI LS 3/20\par \par Patient status post laminectomies from T11-L2. Previous study \par  demonstrated screws extending from the left side through vertebral bodies at \par  T11 to L2. A plate connected the screws. That finding is again demonstrated. \par  Since the previous study, right sided pedicle screws have been placed at L2, \par  L3 and L4. Vertebral disc cages are now demonstrated at L2-L3 and L3-L4. There \par  is extensive metallic artifact from hardware. The artifact from the cage at \par  L3-L4 obscures the ventral aspect of the spinal canal. The artifact from the \par  pedicle screw or the pedicle screw at L3 appears to extend medial to the \par  pedicle into the right side of the spinal canal. Again demonstrated is grade 2 \par  retrolisthesis at L2-L3. \par \par  INTRADURAL SPACE: Again demonstrated is an ectatic spinal canal at all levels \par  from T11 to L3. Canal is wide in AP diameter as well as in width. Wide canal \par  expands neural foramina at multiple levels. Again demonstrated is erosion of \par  the dorsal aspect of the L3 vertebral body. Wide canal could represent \par  congenital dural ectasia/meningoceles and/or previous \par  surgery/pseudomeningoceles. Again demonstrated is an enhancing structure to \par  the right of the midline in the spinal canal at the T11-T12 level. On contrast \par  image 30 series 1001 of the current study, the enhancing structure measures 8 \par  mm in AP diameter by 7 mm in width. On sagittal T2-weighted image 12 series \par  501, the lesion measures 24 mm in height. The lesion has not significantly \par  changed since 2012. In view of history of neurofibromatosis indicated on the \par  report of previous study, the lesion is likely a neurofibroma. No new or \par  enlarging neurofibroma is demonstrated. The distal end of the spinal cord is \par  unremarkable. \par \par  FINDINGS AT SPECIFIC LEVELS: \par \par  L5-S1: Mild facet osteoarthritis. No bulge or herniation. No significant \par  change. \par \par  L4-L5: Mild facet osteoarthritis. No bulge or herniation. No significant \par  change. \par \par  L3-L4: As noted above, artifact from the cage appears to extend into the \par  spinal canal is possible that a small amount of the cage extends into the \par  spinal canal, but there is no evidence of disc bulge or herniation. \par \par  L2-L3: Again demonstrated is grade 2 retrolisthesis. There is mild central \par  stenosis without change. \par \par  L1 level: While the intervertebral discs at T12-L1 and L1-L2 do not \par  protrude into the spinal canal there is a bony protrusion of the superior and \par  inferior endplates of the L1 vertebral body into the spinal canal. That bony \par  protrusion was present on the previous study. However, there is now severe \par  spinal stenosis at the level of the inferior endplate of the L1 vertebral body \par  (compare sagittal images 4-9 series 501 of the current study to sagittal \par  images 7-12 series 601 of the previous study in 2012). At the level of the \par  superior endplate of L1, the right side of the thecal sac is now compressed, \par  but the left side is widely patent. \par \par  BONE MARROW SIGNAL: No neoplastic replacement of bone marrow. No evidence of \par  osteomyelitis/discitis. \par \par  ALIGNMENT: Again demonstrated is a thoracolumbar kyphoscoliosis. Again \par  demonstrated is second-degree retrolisthesis of L2 with respect to L3. \par \par  PARASPINAL SOFT TISSUES: There is no paraspinal mass. There is no paraspinal \par  neurofibroma. Small follicles in the right ovary are physiologic in a young \par  patient. There are small bilateral renal lesions that are not fully \par  characterized, but are consistent with cysts. \par \par \par  IMPRESSION: Status post additional surgery since previous study 2012. Artifact \par  from the right L3 pedicle screw or the actual pedicle screw appears to extend \par  into the spinal canal. Artifact from the disc cage at L3-L4 protrudes into the \par  spinal canal. It is possible that a small amount of the cage extends into the \par  spinal canal. \par \par  Interval development of severe spinal stenosis at the level of the inferior \par  endplate of L1. \par \par  No change in presumed neurofibroma in the right side of the spinal canal at \par  the T11-T12 level.

## 2021-12-06 ENCOUNTER — RESULT REVIEW (OUTPATIENT)
Age: 46
End: 2021-12-06

## 2021-12-09 ENCOUNTER — RESULT REVIEW (OUTPATIENT)
Age: 46
End: 2021-12-09

## 2021-12-09 ENCOUNTER — APPOINTMENT (OUTPATIENT)
Dept: PAIN MANAGEMENT | Facility: HOSPITAL | Age: 46
End: 2021-12-09

## 2021-12-13 ENCOUNTER — RESULT REVIEW (OUTPATIENT)
Age: 46
End: 2021-12-13

## 2021-12-16 ENCOUNTER — RESULT REVIEW (OUTPATIENT)
Age: 46
End: 2021-12-16

## 2021-12-16 ENCOUNTER — APPOINTMENT (OUTPATIENT)
Dept: PAIN MANAGEMENT | Facility: HOSPITAL | Age: 46
End: 2021-12-16

## 2022-01-12 ENCOUNTER — APPOINTMENT (OUTPATIENT)
Dept: PAIN MANAGEMENT | Facility: CLINIC | Age: 47
End: 2022-01-12
Payer: MEDICARE

## 2022-01-12 PROCEDURE — 99214 OFFICE O/P EST MOD 30 MIN: CPT | Mod: 95

## 2022-01-12 NOTE — PHYSICAL EXAM
[Normal] : Well developed, in no acute distress, alert and oriented to person, place and time [de-identified] : \par Constitutional: Normal, well developed, no acute distress on audio/video examination\par Eyes: Symmetric, External structures on video examination\par ENT: Lips, mucosa and tongue normal on video examination\par Oropharynx: Lips normal, symmetric, no external lesions appreciated appreciated on video examination\par Respiratory: Non-labored breathing, no audible wheezes appreciated on audio/video examination\par Vascular: No cyanosis appreciated or edema appreciated on video examination\par GI:  no jaundice appreciated on video examination\par Neurovascular: CN grossly intact on video/audio examination, alert\par MSK: Normal muscle bulk on video examination\par

## 2022-01-12 NOTE — HISTORY OF PRESENT ILLNESS
[Back Pain] : back pain [___ yrs] : [unfilled] year(s) ago [7] : an average pain level of 7/10 [10] : a maximum pain level of 10/10 [Transitioning] : transitioning [Medications] : medications [Home] : at home, [unfilled] , at the time of the visit. [Medical Office: ([facility])___] : at the medical office located in  [Verbal consent obtained from patient] : the patient, [unfilled] [FreeTextEntry1] : Interval Note:\par \par sp LEFt and RIGHT L4-sacral ala mb RFA completed 12/16/21 with 100% sustained improvement in back and leg pain.  Patient is not requiring any analgesics\par  she is having breakthrough right axial pain, worse with transition.  \par Denies any additional weakness, numbness, bowel/bladder dysfunction. \par \par \par HPI\par \par Ms. MAURO SCHNEIDER is a 46 year F with pmhx of neurofibromatosis with neurofibroma sp laminectomy, decompression and fusion presents with bilateral lower back pain radiating to bilateral subchondral, and lower back pain radiating to left anterior thigh.  Pain is so bad that patient finds it difficult to perform adls and ambulate. denies any worsening numbness, weakness, bowel/bladder dysfunction.  \par \par Sensory  painful to touch\par \par Vasomotor  warmer over painful area\par \par Sudomotor/oedema  - edema\par \par Motor/trophic  decreased ROM\par \par Previous and current pain medications/doses/effects:\par \par percocet 5/325 \par Medical cannabis \par Tizanidine prn spasm\par cymbalta \par \par Previous Pain Treatments:\par \par PT without improvement\par \par Previous Pain Injections:\par \par LEFt and RIGHT L4-sacral ala mb RFA completed 12/16/21 \par BILATERAL L4-sacral ala diagnostic medial branch block 10/7/21\par   caudal epidural steroid injection 7/26/21\par \par Previous Diagnostic Studies/Images:\par \par \par MRI LS 3/20\par \par Patient status post laminectomies from T11-L2. Previous study \par  demonstrated screws extending from the left side through vertebral bodies at \par  T11 to L2. A plate connected the screws. That finding is again demonstrated. \par  Since the previous study, right sided pedicle screws have been placed at L2, \par  L3 and L4. Vertebral disc cages are now demonstrated at L2-L3 and L3-L4. There \par  is extensive metallic artifact from hardware. The artifact from the cage at \par  L3-L4 obscures the ventral aspect of the spinal canal. The artifact from the \par  pedicle screw or the pedicle screw at L3 appears to extend medial to the \par  pedicle into the right side of the spinal canal. Again demonstrated is grade 2 \par  retrolisthesis at L2-L3. \par \par  INTRADURAL SPACE: Again demonstrated is an ectatic spinal canal at all levels \par  from T11 to L3. Canal is wide in AP diameter as well as in width. Wide canal \par  expands neural foramina at multiple levels. Again demonstrated is erosion of \par  the dorsal aspect of the L3 vertebral body. Wide canal could represent \par  congenital dural ectasia/meningoceles and/or previous \par  surgery/pseudomeningoceles. Again demonstrated is an enhancing structure to \par  the right of the midline in the spinal canal at the T11-T12 level. On contrast \par  image 30 series 1001 of the current study, the enhancing structure measures 8 \par  mm in AP diameter by 7 mm in width. On sagittal T2-weighted image 12 series \par  501, the lesion measures 24 mm in height. The lesion has not significantly \par  changed since 2012. In view of history of neurofibromatosis indicated on the \par  report of previous study, the lesion is likely a neurofibroma. No new or \par  enlarging neurofibroma is demonstrated. The distal end of the spinal cord is \par  unremarkable. \par \par  FINDINGS AT SPECIFIC LEVELS: \par \par  L5-S1: Mild facet osteoarthritis. No bulge or herniation. No significant \par  change. \par \par  L4-L5: Mild facet osteoarthritis. No bulge or herniation. No significant \par  change. \par \par  L3-L4: As noted above, artifact from the cage appears to extend into the \par  spinal canal is possible that a small amount of the cage extends into the \par  spinal canal, but there is no evidence of disc bulge or herniation. \par \par  L2-L3: Again demonstrated is grade 2 retrolisthesis. There is mild central \par  stenosis without change. \par \par  L1 level: While the intervertebral discs at T12-L1 and L1-L2 do not \par  protrude into the spinal canal there is a bony protrusion of the superior and \par  inferior endplates of the L1 vertebral body into the spinal canal. That bony \par  protrusion was present on the previous study. However, there is now severe \par  spinal stenosis at the level of the inferior endplate of the L1 vertebral body \par  (compare sagittal images 4-9 series 501 of the current study to sagittal \par  images 7-12 series 601 of the previous study in 2012). At the level of the \par  superior endplate of L1, the right side of the thecal sac is now compressed, \par  but the left side is widely patent. \par \par  BONE MARROW SIGNAL: No neoplastic replacement of bone marrow. No evidence of \par  osteomyelitis/discitis. \par \par  ALIGNMENT: Again demonstrated is a thoracolumbar kyphoscoliosis. Again \par  demonstrated is second-degree retrolisthesis of L2 with respect to L3. \par \par  PARASPINAL SOFT TISSUES: There is no paraspinal mass. There is no paraspinal \par  neurofibroma. Small follicles in the right ovary are physiologic in a young \par  patient. There are small bilateral renal lesions that are not fully \par  characterized, but are consistent with cysts. \par \par \par  IMPRESSION: Status post additional surgery since previous study 2012. Artifact \par  from the right L3 pedicle screw or the actual pedicle screw appears to extend \par  into the spinal canal. Artifact from the disc cage at L3-L4 protrudes into the \par  spinal canal. It is possible that a small amount of the cage extends into the \par  spinal canal. \par \par  Interval development of severe spinal stenosis at the level of the inferior \par  endplate of L1. \par \par  No change in presumed neurofibroma in the right side of the spinal canal at \par  the T11-T12 level.

## 2022-01-12 NOTE — ASSESSMENT
[FreeTextEntry1] : >> Imaging and Other Studies\par \par I personally reviewed the relevant imaging.  Discussed and explained to patient the likely source of pathology and pain.  Questions answered. MRI\par \par >> Therapy and Other Modalities\par \par PT - referral provided\par \par >> Medications\par  \par trial diclofenac for for breakthrough, recommend to take with meals\par \par I advised MAURO that the NSAID should be taken with food.  In addition while taking the prescribed NSAID, no over the counter or other NSAIDs should be used, such as ibuprofen (Motrin or Advil) or naproxen (Aleve) as this can cause stomach upset or other side effects.  If needed for fever or breakthrough pain Tylenol can be used.\par \par Regarding opiate medication to manage pain. I had a detailed discussion with the patient regarding the risks of long-term opioid use, including the potential for medication side effects, hyperaglesia, endocrine dysfunction,  Encouraged weaning with assistance of current prescriber.\par \par >> Interventions\par \par persistent pain secondary to lumbosacral stenosis and postlaminectomy p ain demonstrated on imaging refractory to conservative treatments, sp caudal epidural steroid injection\par \par neuropathic pain - possible causalgia \par \par may consider LSB for sympathetic pain \par \par Significant component of axial back pain secondary to lumbar spondylosis and facet arthropathy demonstrated on MRI LS.  Pain refractory to conservative treatments.  sp BILATERAL L4-sacral ala diagnostic medial branch block (2 joints, 3 nerves on each side) x 2\par \par Given significant relief from diagnostic lumbar medial branch block of >80%, and significant improvement in function (as measured by SANJEEV) and continued lumbar facet arthropathy pain (demonstrated on imaging) and axial back pain,sp  RIGHT then LEFT  L4-sacral ala medial branch (2 joints, 3 nerves on each side) radiofrequency ablation with significant improvement\par \par \par May consider scs trial for postlaminectomy pain\par \par >> Consults\par \par neurology - establish care for hx of neurofibromatosis\par patient will consult with PMD\par \par >> Discussion of Risks/Benefits/Alternatives\par \par \par \par 	>Regarding any scheduled procedures:\par \par I have discussed in detail with the patient that any interventional pain procedure is associated with potential risks.  The procedure may include an injection of steroids and potentially other medications (local anesthetic and normal saline) into the epidural space or surrounding tissue of the spine.  There are significant risks of this procedure which include and are not limited to infection, bleeding, worsening pain, dural puncture leading to postdural puncture headache, nerve damage, spinal cord injury, paralysis, stroke, and death.  \par \par There is a chance that the procedure does not improve their pain.  \par \par There are risks associated with the steroid being absorbed into the body systemically.  These include dysphoria, difficulty sleeping, mood swings and personality changes.  Premenopausal women may notice an irregularity in her menstrual cycle for 2-3 months following the injection.  Steroids can specifically affect patients with hypertension, diabetes, and peptic ulcers.  The procedure may cause a temporary increase in blood pressure and blood pressure, and may adversely affect a peptic ulcer.  Other, more rare complications, include avascular necrosis of joints, glaucoma and worsening of osteoporosis. \par \par I have discussed the risks of the procedure at length with the patient, and the potential benefits of pain relief.  I have offered alternatives to the procedure.  All questions were answered.  \par \par The patient expressed understanding and wishes to proceed with the procedure.\par \par 	>Regardin COVID19 Pandemic: \par \par Any planned interventional pain procedure are scheduled because further delay may cause harm or negative outcome to patient.  The goal in performing this procedure is to avoid deterioration of function, emergency room visits (which increases exposure) and reliance on opioids.  \par \par r/b/a discussed with patient, lack of evidence to conclusively determine whether pain management procedures have any positive or negative impact on the possibility of wally the virus and/or development of any sequelae. \par \par Patient counselled regarding timing steroid based intervention 2 weeks before or after COVID-19 vaccine administration to avoid any interaction or affect on efficacy of vaccination\par \par Patient demonstrates understanding\par \par Informed patient that risks associated with the COVID-19 infection.  Informed patient steps taken to limit the risks.  We are implementing safety precautions and following protocols consistent with the CDC and state recommendations. All patients and staff will be checked for fever or signs of illness upon entry to the facility. We will limit our steroid dose to the lowest effective therapeutic dose or in some cases steroids will not be injected at all. \par \par Patient agrees to proceed\par \par \par >> Conclusion\par \par \par The above diagnosis and treatment plan is medically reasonable and necessary based on the patient encounter \par There were no barriers to communication.\par Informed patient that I would be available for any additional questions.\par Patient was instructed to call with any worsening symptoms including severe pain, new numbness/weakness, or changes in the bowel/bladder function. \par Discussed role of nsaids in pain management and all relevant risks, if patient is continuing to require after 4 weeks the patient should f/u for alternative treatment. \par Instructed patient to maintain pain diary to monitor pain level, mobility, and function.\par \par I explained to patient benefits and limitation of TeleMedicine visits\par \par Patient understands that limitations include inability to perform comprehensive physical exam, which may lead to potential diagnostic inconsistencies.  \par \par Any scheduled procedures are based on history, imaging and limited physical exam performed on TeleHealth visit.  If necessary, additional focal physical exam will be performed on date of procedure\par \par Patient understands that diagnosis and treatment may be limited by these inconsistencies and patient agrees to proceed with care plan\par \par \par

## 2022-03-01 ENCOUNTER — APPOINTMENT (OUTPATIENT)
Dept: PAIN MANAGEMENT | Facility: CLINIC | Age: 47
End: 2022-03-01
Payer: MEDICARE

## 2022-03-01 NOTE — HISTORY OF PRESENT ILLNESS
[FreeTextEntry1] : Interval Note:\par \par sp LEFt and RIGHT L4-sacral ala mb RFA completed 12/16/21 with 100% sustained improvement in back and leg pain.  Patient is not requiring any analgesics\par  she is having breakthrough right axial pain, worse with transition.  \par Denies any additional weakness, numbness, bowel/bladder dysfunction. \par \par \par HPI\par \par Ms. MAURO SCHNEIDER is a 46 year F with pmhx of neurofibromatosis with neurofibroma sp laminectomy, decompression and fusion presents with bilateral lower back pain radiating to bilateral subchondral, and lower back pain radiating to left anterior thigh.  Pain is so bad that patient finds it difficult to perform adls and ambulate. denies any worsening numbness, weakness, bowel/bladder dysfunction.  \par \par Sensory  painful to touch\par \par Vasomotor  warmer over painful area\par \par Sudomotor/oedema  - edema\par \par Motor/trophic  decreased ROM\par \par Previous and current pain medications/doses/effects:\par \par percocet 5/325 \par Medical cannabis \par Tizanidine prn spasm\par cymbalta \par \par Previous Pain Treatments:\par \par PT without improvement\par \par Previous Pain Injections:\par \par LEFt and RIGHT L4-sacral ala mb RFA completed 12/16/21 \par BILATERAL L4-sacral ala diagnostic medial branch block 10/7/21\par   caudal epidural steroid injection 7/26/21\par \par Previous Diagnostic Studies/Images:\par \par \par MRI LS 3/20\par \par Patient status post laminectomies from T11-L2. Previous study \par  demonstrated screws extending from the left side through vertebral bodies at \par  T11 to L2. A plate connected the screws. That finding is again demonstrated. \par  Since the previous study, right sided pedicle screws have been placed at L2, \par  L3 and L4. Vertebral disc cages are now demonstrated at L2-L3 and L3-L4. There \par  is extensive metallic artifact from hardware. The artifact from the cage at \par  L3-L4 obscures the ventral aspect of the spinal canal. The artifact from the \par  pedicle screw or the pedicle screw at L3 appears to extend medial to the \par  pedicle into the right side of the spinal canal. Again demonstrated is grade 2 \par  retrolisthesis at L2-L3. \par \par  INTRADURAL SPACE: Again demonstrated is an ectatic spinal canal at all levels \par  from T11 to L3. Canal is wide in AP diameter as well as in width. Wide canal \par  expands neural foramina at multiple levels. Again demonstrated is erosion of \par  the dorsal aspect of the L3 vertebral body. Wide canal could represent \par  congenital dural ectasia/meningoceles and/or previous \par  surgery/pseudomeningoceles. Again demonstrated is an enhancing structure to \par  the right of the midline in the spinal canal at the T11-T12 level. On contrast \par  image 30 series 1001 of the current study, the enhancing structure measures 8 \par  mm in AP diameter by 7 mm in width. On sagittal T2-weighted image 12 series \par  501, the lesion measures 24 mm in height. The lesion has not significantly \par  changed since 2012. In view of history of neurofibromatosis indicated on the \par  report of previous study, the lesion is likely a neurofibroma. No new or \par  enlarging neurofibroma is demonstrated. The distal end of the spinal cord is \par  unremarkable. \par \par  FINDINGS AT SPECIFIC LEVELS: \par \par  L5-S1: Mild facet osteoarthritis. No bulge or herniation. No significant \par  change. \par \par  L4-L5: Mild facet osteoarthritis. No bulge or herniation. No significant \par  change. \par \par  L3-L4: As noted above, artifact from the cage appears to extend into the \par  spinal canal is possible that a small amount of the cage extends into the \par  spinal canal, but there is no evidence of disc bulge or herniation. \par \par  L2-L3: Again demonstrated is grade 2 retrolisthesis. There is mild central \par  stenosis without change. \par \par  L1 level: While the intervertebral discs at T12-L1 and L1-L2 do not \par  protrude into the spinal canal there is a bony protrusion of the superior and \par  inferior endplates of the L1 vertebral body into the spinal canal. That bony \par  protrusion was present on the previous study. However, there is now severe \par  spinal stenosis at the level of the inferior endplate of the L1 vertebral body \par  (compare sagittal images 4-9 series 501 of the current study to sagittal \par  images 7-12 series 601 of the previous study in 2012). At the level of the \par  superior endplate of L1, the right side of the thecal sac is now compressed, \par  but the left side is widely patent. \par \par  BONE MARROW SIGNAL: No neoplastic replacement of bone marrow. No evidence of \par  osteomyelitis/discitis. \par \par  ALIGNMENT: Again demonstrated is a thoracolumbar kyphoscoliosis. Again \par  demonstrated is second-degree retrolisthesis of L2 with respect to L3. \par \par  PARASPINAL SOFT TISSUES: There is no paraspinal mass. There is no paraspinal \par  neurofibroma. Small follicles in the right ovary are physiologic in a young \par  patient. There are small bilateral renal lesions that are not fully \par  characterized, but are consistent with cysts. \par \par \par  IMPRESSION: Status post additional surgery since previous study 2012. Artifact \par  from the right L3 pedicle screw or the actual pedicle screw appears to extend \par  into the spinal canal. Artifact from the disc cage at L3-L4 protrudes into the \par  spinal canal. It is possible that a small amount of the cage extends into the \par  spinal canal. \par \par  Interval development of severe spinal stenosis at the level of the inferior \par  endplate of L1. \par \par  No change in presumed neurofibroma in the right side of the spinal canal at \par  the T11-T12 level.  [Back Pain] : back pain [___ yrs] : [unfilled] year(s) ago [7] : an average pain level of 7/10 [10] : a maximum pain level of 10/10 [Transitioning] : transitioning [Medications] : medications [Home] : at home, [unfilled] , at the time of the visit. [Medical Office: ([facility])___] : at the medical office located in  [Verbal consent obtained from patient] : the patient, [unfilled]

## 2022-03-08 ENCOUNTER — APPOINTMENT (OUTPATIENT)
Dept: PAIN MANAGEMENT | Facility: CLINIC | Age: 47
End: 2022-03-08
Payer: MEDICARE

## 2022-03-08 PROCEDURE — 99213 OFFICE O/P EST LOW 20 MIN: CPT | Mod: 95

## 2022-03-08 RX ORDER — CHLORZOXAZONE 250 MG/1
250 TABLET ORAL
Qty: 21 | Refills: 0 | Status: DISCONTINUED | COMMUNITY
Start: 2022-03-01 | End: 2022-03-08

## 2022-03-08 NOTE — ASSESSMENT
[FreeTextEntry1] : >> Imaging and Other Studies\par \par I personally reviewed the relevant imaging.  Discussed and explained to patient the likely source of pathology and pain.  Questions answered. MRI\par \par >> Therapy and Other Modalities\par \par PT - referral provided\par continue myofascial therapy\par \par >> Medications\par  \par trial diclofenac for for breakthrough, recommend to take with meals\par \par I advised MAURO that the NSAID should be taken with food.  In addition while taking the prescribed NSAID, no over the counter or other NSAIDs should be used, such as ibuprofen (Motrin or Advil) or naproxen (Aleve) as this can cause stomach upset or other side effects.  If needed for fever or breakthrough pain Tylenol can be used.\par \par Regarding opiate medication to manage pain. I had a detailed discussion with the patient regarding the risks of long-term opioid use, including the potential for medication side effects, hyperaglesia, endocrine dysfunction,  Encouraged weaning with assistance of current prescriber.\par \par >> Interventions\par \par persistent pain secondary to lumbosacral stenosis and postlaminectomy pain demonstrated on imaging refractory to conservative treatments, sp caudal epidural steroid injection\par \par may consider repeat caudal epidural steroid injection\par \par neuropathic pain - possible causalgia \par \par may consider LSB for sympathetic pain \par \par Significant component of axial back pain secondary to lumbar spondylosis and facet arthropathy demonstrated on MRI LS.  Pain refractory to conservative treatments.  sp BILATERAL L4-sacral ala diagnostic medial branch block (2 joints, 3 nerves on each side) x 2\par \par Given significant relief from diagnostic lumbar medial branch block of >80%, and significant improvement in function (as measured by SANJEEV) and continued lumbar facet arthropathy pain (demonstrated on imaging) and axial back pain,sp  RIGHT then LEFT  L4-sacral ala medial branch (2 joints, 3 nerves on each side) radiofrequency ablation with significant improvement\par \par \par May consider scs trial for postlaminectomy pain\par \par >> Consults\par \par neurology - establish care for hx of neurofibromatosis\par patient will consult with PMD\par \par >> Discussion of Risks/Benefits/Alternatives\par \par \par \par 	>Regarding any scheduled procedures:\par \par I have discussed in detail with the patient that any interventional pain procedure is associated with potential risks.  The procedure may include an injection of steroids and potentially other medications (local anesthetic and normal saline) into the epidural space or surrounding tissue of the spine.  There are significant risks of this procedure which include and are not limited to infection, bleeding, worsening pain, dural puncture leading to postdural puncture headache, nerve damage, spinal cord injury, paralysis, stroke, and death.  \par \par There is a chance that the procedure does not improve their pain.  \par \par There are risks associated with the steroid being absorbed into the body systemically.  These include dysphoria, difficulty sleeping, mood swings and personality changes.  Premenopausal women may notice an irregularity in her menstrual cycle for 2-3 months following the injection.  Steroids can specifically affect patients with hypertension, diabetes, and peptic ulcers.  The procedure may cause a temporary increase in blood pressure and blood pressure, and may adversely affect a peptic ulcer.  Other, more rare complications, include avascular necrosis of joints, glaucoma and worsening of osteoporosis. \par \par I have discussed the risks of the procedure at length with the patient, and the potential benefits of pain relief.  I have offered alternatives to the procedure.  All questions were answered.  \par \par The patient expressed understanding and wishes to proceed with the procedure.\par \par 	>Regardin COVID19 Pandemic: \par \par Any planned interventional pain procedure are scheduled because further delay may cause harm or negative outcome to patient.  The goal in performing this procedure is to avoid deterioration of function, emergency room visits (which increases exposure) and reliance on opioids.  \par \par r/b/a discussed with patient, lack of evidence to conclusively determine whether pain management procedures have any positive or negative impact on the possibility of wally the virus and/or development of any sequelae. \par \par Patient counselled regarding timing steroid based intervention 2 weeks before or after COVID-19 vaccine administration to avoid any interaction or affect on efficacy of vaccination\par \par Patient demonstrates understanding\par \par Informed patient that risks associated with the COVID-19 infection.  Informed patient steps taken to limit the risks.  We are implementing safety precautions and following protocols consistent with the CDC and state recommendations. All patients and staff will be checked for fever or signs of illness upon entry to the facility. We will limit our steroid dose to the lowest effective therapeutic dose or in some cases steroids will not be injected at all. \par \par Patient agrees to proceed\par \par \par >> Conclusion\par \par \par The above diagnosis and treatment plan is medically reasonable and necessary based on the patient encounter \par There were no barriers to communication.\par Informed patient that I would be available for any additional questions.\par Patient was instructed to call with any worsening symptoms including severe pain, new numbness/weakness, or changes in the bowel/bladder function. \par Discussed role of nsaids in pain management and all relevant risks, if patient is continuing to require after 4 weeks the patient should f/u for alternative treatment. \par Instructed patient to maintain pain diary to monitor pain level, mobility, and function.\par \par I explained to patient benefits and limitation of TeleMedicine visits\par \par Patient understands that limitations include inability to perform comprehensive physical exam, which may lead to potential diagnostic inconsistencies.  \par \par Any scheduled procedures are based on history, imaging and limited physical exam performed on TeleHealth visit.  If necessary, additional focal physical exam will be performed on date of procedure\par \par Patient understands that diagnosis and treatment may be limited by these inconsistencies and patient agrees to proceed with care plan\par \par \par

## 2022-03-08 NOTE — PHYSICAL EXAM
[Normal] : Normal affect [de-identified] : \par Constitutional: Normal, well developed, no acute distress on audio/video examination\par Eyes: Symmetric, External structures on video examination\par ENT: Lips, mucosa and tongue normal on video examination\par Oropharynx: Lips normal, symmetric, no external lesions appreciated appreciated on video examination\par Respiratory: Non-labored breathing, no audible wheezes appreciated on audio/video examination\par Vascular: No cyanosis appreciated or edema appreciated on video examination\par GI:  no jaundice appreciated on video examination\par Neurovascular: CN grossly intact on video/audio examination, alert\par MSK: Normal muscle bulk on video examination\par

## 2022-03-08 NOTE — HISTORY OF PRESENT ILLNESS
[Back Pain] : back pain [___ yrs] : [unfilled] year(s) ago [7] : an average pain level of 7/10 [10] : a maximum pain level of 10/10 [Transitioning] : transitioning [Medications] : medications [Home] : at home, [unfilled] , at the time of the visit. [Medical Office: ([facility])___] : at the medical office located in  [Verbal consent obtained from patient] : the patient, [unfilled] [FreeTextEntry1] : Interval Note:\par \par Patient reports that she had exacerbation of back pain, affecting adls and ability to move, but improved with a massage.  Not requiring any medications. Patient feels well today and able to perform adls.  \par Denies any additional weakness, numbness, bowel/bladder dysfunction. \par \par \par HPI\par \par Ms. MAURO SCHNEIDER is a 46 year F with pmhx of neurofibromatosis with neurofibroma sp laminectomy, decompression and fusion presents with bilateral lower back pain radiating to bilateral subchondral, and lower back pain radiating to left anterior thigh.  Pain is so bad that patient finds it difficult to perform adls and ambulate. denies any worsening numbness, weakness, bowel/bladder dysfunction.  \par \par Sensory  painful to touch\par \par Vasomotor  warmer over painful area\par \par Sudomotor/oedema  - edema\par \par Motor/trophic  decreased ROM\par \par Previous and current pain medications/doses/effects:\par \par percocet 5/325 \par Medical cannabis \par Tizanidine prn spasm\par cymbalta \par \par Previous Pain Treatments:\par \par PT without improvement\par \par Previous Pain Injections:\par \par LEFt and RIGHT L4-sacral ala mb RFA completed 12/16/21 \par BILATERAL L4-sacral ala diagnostic medial branch block 10/7/21\par   caudal epidural steroid injection 7/26/21\par \par Previous Diagnostic Studies/Images:\par \par \par MRI LS 3/20\par \par Patient status post laminectomies from T11-L2. Previous study \par  demonstrated screws extending from the left side through vertebral bodies at \par  T11 to L2. A plate connected the screws. That finding is again demonstrated. \par  Since the previous study, right sided pedicle screws have been placed at L2, \par  L3 and L4. Vertebral disc cages are now demonstrated at L2-L3 and L3-L4. There \par  is extensive metallic artifact from hardware. The artifact from the cage at \par  L3-L4 obscures the ventral aspect of the spinal canal. The artifact from the \par  pedicle screw or the pedicle screw at L3 appears to extend medial to the \par  pedicle into the right side of the spinal canal. Again demonstrated is grade 2 \par  retrolisthesis at L2-L3. \par \par  INTRADURAL SPACE: Again demonstrated is an ectatic spinal canal at all levels \par  from T11 to L3. Canal is wide in AP diameter as well as in width. Wide canal \par  expands neural foramina at multiple levels. Again demonstrated is erosion of \par  the dorsal aspect of the L3 vertebral body. Wide canal could represent \par  congenital dural ectasia/meningoceles and/or previous \par  surgery/pseudomeningoceles. Again demonstrated is an enhancing structure to \par  the right of the midline in the spinal canal at the T11-T12 level. On contrast \par  image 30 series 1001 of the current study, the enhancing structure measures 8 \par  mm in AP diameter by 7 mm in width. On sagittal T2-weighted image 12 series \par  501, the lesion measures 24 mm in height. The lesion has not significantly \par  changed since 2012. In view of history of neurofibromatosis indicated on the \par  report of previous study, the lesion is likely a neurofibroma. No new or \par  enlarging neurofibroma is demonstrated. The distal end of the spinal cord is \par  unremarkable. \par \par  FINDINGS AT SPECIFIC LEVELS: \par \par  L5-S1: Mild facet osteoarthritis. No bulge or herniation. No significant \par  change. \par \par  L4-L5: Mild facet osteoarthritis. No bulge or herniation. No significant \par  change. \par \par  L3-L4: As noted above, artifact from the cage appears to extend into the \par  spinal canal is possible that a small amount of the cage extends into the \par  spinal canal, but there is no evidence of disc bulge or herniation. \par \par  L2-L3: Again demonstrated is grade 2 retrolisthesis. There is mild central \par  stenosis without change. \par \par  L1 level: While the intervertebral discs at T12-L1 and L1-L2 do not \par  protrude into the spinal canal there is a bony protrusion of the superior and \par  inferior endplates of the L1 vertebral body into the spinal canal. That bony \par  protrusion was present on the previous study. However, there is now severe \par  spinal stenosis at the level of the inferior endplate of the L1 vertebral body \par  (compare sagittal images 4-9 series 501 of the current study to sagittal \par  images 7-12 series 601 of the previous study in 2012). At the level of the \par  superior endplate of L1, the right side of the thecal sac is now compressed, \par  but the left side is widely patent. \par \par  BONE MARROW SIGNAL: No neoplastic replacement of bone marrow. No evidence of \par  osteomyelitis/discitis. \par \par  ALIGNMENT: Again demonstrated is a thoracolumbar kyphoscoliosis. Again \par  demonstrated is second-degree retrolisthesis of L2 with respect to L3. \par \par  PARASPINAL SOFT TISSUES: There is no paraspinal mass. There is no paraspinal \par  neurofibroma. Small follicles in the right ovary are physiologic in a young \par  patient. There are small bilateral renal lesions that are not fully \par  characterized, but are consistent with cysts. \par \par \par  IMPRESSION: Status post additional surgery since previous study 2012. Artifact \par  from the right L3 pedicle screw or the actual pedicle screw appears to extend \par  into the spinal canal. Artifact from the disc cage at L3-L4 protrudes into the \par  spinal canal. It is possible that a small amount of the cage extends into the \par  spinal canal. \par \par  Interval development of severe spinal stenosis at the level of the inferior \par  endplate of L1. \par \par  No change in presumed neurofibroma in the right side of the spinal canal at \par  the T11-T12 level.

## 2022-07-15 ENCOUNTER — APPOINTMENT (OUTPATIENT)
Dept: PAIN MANAGEMENT | Facility: CLINIC | Age: 47
End: 2022-07-15

## 2022-07-15 VITALS
BODY MASS INDEX: 23.73 KG/M2 | HEIGHT: 57 IN | WEIGHT: 110 LBS | SYSTOLIC BLOOD PRESSURE: 122 MMHG | DIASTOLIC BLOOD PRESSURE: 82 MMHG

## 2022-07-15 PROCEDURE — 99214 OFFICE O/P EST MOD 30 MIN: CPT

## 2022-07-15 RX ORDER — DICLOFENAC SODIUM 25 MG/1
25 TABLET, DELAYED RELEASE ORAL
Qty: 45 | Refills: 0 | Status: DISCONTINUED | COMMUNITY
Start: 2021-08-13 | End: 2022-07-15

## 2022-07-15 NOTE — ASSESSMENT
[FreeTextEntry1] : >> Imaging and Other Studies\par \par I personally reviewed the relevant imaging.  Discussed and explained to patient the likely source of pathology and pain.  Questions answered. MRI\par \par lumbar radiculopathy in setting of postlaminectomy and grade 2 spondylolisthesis, will obtain repeat MRI LS\par \par XR Flex/ ext\par  \par >> Therapy and Other Modalities\par \par PT - referral provided\par continue myofascial therapy\par \par >> Medications\par  \par trial gabapentin uptitrate to TID\par cautioned change in mood.  Encouraged to call with any worsening mood or depression/suicidal ideations\par \par trial diclofenac for for breakthrough, recommend to take with meals\par \par I advised MAURO that the NSAID should be taken with food.  In addition while taking the prescribed NSAID, no over the counter or other NSAIDs should be used, such as ibuprofen (Motrin or Advil) or naproxen (Aleve) as this can cause stomach upset or other side effects.  If needed for fever or breakthrough pain Tylenol can be used.\par \par Regarding opiate medication to manage pain. I had a detailed discussion with the patient regarding the risks of long-term opioid use, including the potential for medication side effects, hyperaglesia, endocrine dysfunction,  Encouraged weaning with assistance of current prescriber.\par \par >> Interventions\par \par persistent pain secondary to lumbosacral stenosis and postlaminectomy pain demonstrated on imaging refractory to conservative treatments, sp caudal epidural steroid injection\par \par may consider repeat caudal epidural steroid injection\par \par neuropathic pain - possible causalgia \par \par may consider LSB for sympathetic pain \par \par Significant component of axial back pain secondary to lumbar spondylosis and facet arthropathy demonstrated on MRI LS.  Pain refractory to conservative treatments.  sp BILATERAL L4-sacral ala diagnostic medial branch block (2 joints, 3 nerves on each side) x 2\par \par Given significant relief from diagnostic lumbar medial branch block of >80%, and significant improvement in function (as measured by SANJEEV) and continued lumbar facet arthropathy pain (demonstrated on imaging) and axial back pain,sp  RIGHT then LEFT  L4-sacral ala medial branch (2 joints, 3 nerves on each side) radiofrequency ablation with significant improvement\par \par \par May consider scs trial for postlaminectomy pain\par \par >> Consults\par \par neurology - establish care for hx of neurofibromatosis\par patient will consult with PMD\par \par referral to spine for evaluation \par >> Discussion of Risks/Benefits/Alternatives\par \par \par \par 	>Regarding any scheduled procedures:\par \par I have discussed in detail with the patient that any interventional pain procedure is associated with potential risks.  The procedure may include an injection of steroids and potentially other medications (local anesthetic and normal saline) into the epidural space or surrounding tissue of the spine.  There are significant risks of this procedure which include and are not limited to infection, bleeding, worsening pain, dural puncture leading to postdural puncture headache, nerve damage, spinal cord injury, paralysis, stroke, and death.  \par \par There is a chance that the procedure does not improve their pain.  \par \par There are risks associated with the steroid being absorbed into the body systemically.  These include dysphoria, difficulty sleeping, mood swings and personality changes.  Premenopausal women may notice an irregularity in her menstrual cycle for 2-3 months following the injection.  Steroids can specifically affect patients with hypertension, diabetes, and peptic ulcers.  The procedure may cause a temporary increase in blood pressure and blood pressure, and may adversely affect a peptic ulcer.  Other, more rare complications, include avascular necrosis of joints, glaucoma and worsening of osteoporosis. \par \par I have discussed the risks of the procedure at length with the patient, and the potential benefits of pain relief.  I have offered alternatives to the procedure.  All questions were answered.  \par \par The patient expressed understanding and wishes to proceed with the procedure.\par \par 	>Regardin COVID19 Pandemic: \par \par Any planned interventional pain procedure are scheduled because further delay may cause harm or negative outcome to patient.  The goal in performing this procedure is to avoid deterioration of function, emergency room visits (which increases exposure) and reliance on opioids.  \par \par r/b/a discussed with patient, lack of evidence to conclusively determine whether pain management procedures have any positive or negative impact on the possibility of wally the virus and/or development of any sequelae. \par \par Patient counselled regarding timing steroid based intervention 2 weeks before or after COVID-19 vaccine administration to avoid any interaction or affect on efficacy of vaccination\par \par Patient demonstrates understanding\par \par Informed patient that risks associated with the COVID-19 infection.  Informed patient steps taken to limit the risks.  We are implementing safety precautions and following protocols consistent with the CDC and state recommendations. All patients and staff will be checked for fever or signs of illness upon entry to the facility. We will limit our steroid dose to the lowest effective therapeutic dose or in some cases steroids will not be injected at all. \par \par Patient agrees to proceed\par \par \par >> Conclusion\par \par \par The above diagnosis and treatment plan is medically reasonable and necessary based on the patient encounter \par There were no barriers to communication.\par Informed patient that I would be available for any additional questions.\par Patient was instructed to call with any worsening symptoms including severe pain, new numbness/weakness, or changes in the bowel/bladder function. \par Discussed role of nsaids in pain management and all relevant risks, if patient is continuing to require after 4 weeks the patient should f/u for alternative treatment. \par Instructed patient to maintain pain diary to monitor pain level, mobility, and function.\par \par

## 2022-07-15 NOTE — PHYSICAL EXAM
[Normal] : Well developed, in no acute distress, alert and oriented to person, place and time [de-identified] : Toes on right foot cool to touch.

## 2022-07-15 NOTE — HISTORY OF PRESENT ILLNESS
[Back Pain] : back pain [___ yrs] : [unfilled] year(s) ago [7] : an average pain level of 7/10 [10] : a maximum pain level of 10/10 [Transitioning] : transitioning [Medications] : medications [FreeTextEntry1] : Interval Note:\par \par Today c/o worsening right leg sensation changes. Entire right leg feels as if it is in ice. Leg warm to touch. Toes cool to touch. Intermittent electric shocks that runs from right groin to toes. LBP stable. No complaints. Denies any additional weakness, numbness, bowel/bladder dysfunction. \par \par \par HPI\par \par Ms. MAURO SCHNEIDER is a 46 year F with pmhx of neurofibromatosis with neurofibroma sp laminectomy, decompression and fusion presents with bilateral lower back pain radiating to bilateral subchondral, and lower back pain radiating to left anterior thigh.  Pain is so bad that patient finds it difficult to perform adls and ambulate. denies any worsening numbness, weakness, bowel/bladder dysfunction.  \par \par Sensory  painful to touch\par \par Vasomotor  warmer over painful area\par \par Sudomotor/oedema  - edema\par \par Motor/trophic  decreased ROM\par \par Previous and current pain medications/doses/effects:\par \par percocet 5/325 \par Medical cannabis \par Tizanidine prn spasm\par cymbalta \par \par Previous Pain Treatments:\par \par PT without improvement\par \par Previous Pain Injections:\par \par LEFt and RIGHT L4-sacral ala mb RFA completed 12/16/21 \par BILATERAL L4-sacral ala diagnostic medial branch block 10/7/21\par   caudal epidural steroid injection 7/26/21\par \par Previous Diagnostic Studies/Images:\par \par \par MRI LS 3/20\par \par Patient status post laminectomies from T11-L2. Previous study \par  demonstrated screws extending from the left side through vertebral bodies at \par  T11 to L2. A plate connected the screws. That finding is again demonstrated. \par  Since the previous study, right sided pedicle screws have been placed at L2, \par  L3 and L4. Vertebral disc cages are now demonstrated at L2-L3 and L3-L4. There \par  is extensive metallic artifact from hardware. The artifact from the cage at \par  L3-L4 obscures the ventral aspect of the spinal canal. The artifact from the \par  pedicle screw or the pedicle screw at L3 appears to extend medial to the \par  pedicle into the right side of the spinal canal. Again demonstrated is grade 2 \par  retrolisthesis at L2-L3. \par \par  INTRADURAL SPACE: Again demonstrated is an ectatic spinal canal at all levels \par  from T11 to L3. Canal is wide in AP diameter as well as in width. Wide canal \par  expands neural foramina at multiple levels. Again demonstrated is erosion of \par  the dorsal aspect of the L3 vertebral body. Wide canal could represent \par  congenital dural ectasia/meningoceles and/or previous \par  surgery/pseudomeningoceles. Again demonstrated is an enhancing structure to \par  the right of the midline in the spinal canal at the T11-T12 level. On contrast \par  image 30 series 1001 of the current study, the enhancing structure measures 8 \par  mm in AP diameter by 7 mm in width. On sagittal T2-weighted image 12 series \par  501, the lesion measures 24 mm in height. The lesion has not significantly \par  changed since 2012. In view of history of neurofibromatosis indicated on the \par  report of previous study, the lesion is likely a neurofibroma. No new or \par  enlarging neurofibroma is demonstrated. The distal end of the spinal cord is \par  unremarkable. \par \par  FINDINGS AT SPECIFIC LEVELS: \par \par  L5-S1: Mild facet osteoarthritis. No bulge or herniation. No significant \par  change. \par \par  L4-L5: Mild facet osteoarthritis. No bulge or herniation. No significant \par  change. \par \par  L3-L4: As noted above, artifact from the cage appears to extend into the \par  spinal canal is possible that a small amount of the cage extends into the \par  spinal canal, but there is no evidence of disc bulge or herniation. \par \par  L2-L3: Again demonstrated is grade 2 retrolisthesis. There is mild central \par  stenosis without change. \par \par  L1 level: While the intervertebral discs at T12-L1 and L1-L2 do not \par  protrude into the spinal canal there is a bony protrusion of the superior and \par  inferior endplates of the L1 vertebral body into the spinal canal. That bony \par  protrusion was present on the previous study. However, there is now severe \par  spinal stenosis at the level of the inferior endplate of the L1 vertebral body \par  (compare sagittal images 4-9 series 501 of the current study to sagittal \par  images 7-12 series 601 of the previous study in 2012). At the level of the \par  superior endplate of L1, the right side of the thecal sac is now compressed, \par  but the left side is widely patent. \par \par  BONE MARROW SIGNAL: No neoplastic replacement of bone marrow. No evidence of \par  osteomyelitis/discitis. \par \par  ALIGNMENT: Again demonstrated is a thoracolumbar kyphoscoliosis. Again \par  demonstrated is second-degree retrolisthesis of L2 with respect to L3. \par \par  PARASPINAL SOFT TISSUES: There is no paraspinal mass. There is no paraspinal \par  neurofibroma. Small follicles in the right ovary are physiologic in a young \par  patient. There are small bilateral renal lesions that are not fully \par  characterized, but are consistent with cysts. \par \par \par  IMPRESSION: Status post additional surgery since previous study 2012. Artifact \par  from the right L3 pedicle screw or the actual pedicle screw appears to extend \par  into the spinal canal. Artifact from the disc cage at L3-L4 protrudes into the \par  spinal canal. It is possible that a small amount of the cage extends into the \par  spinal canal. \par \par  Interval development of severe spinal stenosis at the level of the inferior \par  endplate of L1. \par \par  No change in presumed neurofibroma in the right side of the spinal canal at \par  the T11-T12 level.

## 2022-07-25 ENCOUNTER — RESULT REVIEW (OUTPATIENT)
Age: 47
End: 2022-07-25

## 2022-08-08 ENCOUNTER — APPOINTMENT (OUTPATIENT)
Dept: NEUROSURGERY | Facility: CLINIC | Age: 47
End: 2022-08-08

## 2022-08-08 ENCOUNTER — NON-APPOINTMENT (OUTPATIENT)
Age: 47
End: 2022-08-08

## 2022-08-08 PROCEDURE — 99205 OFFICE O/P NEW HI 60 MIN: CPT

## 2022-08-09 NOTE — PHYSICAL EXAM
[General Appearance - Alert] : alert [General Appearance - In No Acute Distress] : in no acute distress [Oriented To Time, Place, And Person] : oriented to person, place, and time [Impaired Insight] : insight and judgment were intact [Affect] : the affect was normal [Person] : oriented to person [Place] : oriented to place [Time] : oriented to time [Short Term Intact] : short term memory intact [Remote Intact] : remote memory intact [Span Intact] : the attention span was normal [Concentration Intact] : normal concentrating ability [Fluency] : fluency intact [Comprehension] : comprehension intact [Current Events] : adequate knowledge of current events [Past History] : adequate knowledge of personal past history [Vocabulary] : adequate range of vocabulary [Cranial Nerves Optic (II)] : visual acuity intact bilaterally,  pupils equal round and reactive to light [Cranial Nerves Oculomotor (III)] : extraocular motion intact [Cranial Nerves Trigeminal (V)] : facial sensation intact symmetrically [Cranial Nerves Facial (VII)] : face symmetrical [Cranial Nerves Vestibulocochlear (VIII)] : hearing was intact bilaterally [Cranial Nerves Glossopharyngeal (IX)] : tongue and palate midline [Cranial Nerves Accessory (XI - Cranial And Spinal)] : head turning and shoulder shrug symmetric [Cranial Nerves Hypoglossal (XII)] : there was no tongue deviation with protrusion [Motor Tone] : muscle tone was normal in all four extremities [Motor Strength] : muscle strength was normal in all four extremities [No Muscle Atrophy] : normal bulk in all four extremities [Sensation Tactile Decrease] : light touch was intact [Abnormal Walk] : normal gait [Balance] : balance was intact [Dysesthesia] : dysesthesia was present [Hyperesthesia] : hyperesthesia was present [0] : Ankle jerk right 0 [2+] : Ankle jerk left 2+ [Past-pointing] : there was no past-pointing [Tremor] : no tremor present [FreeTextEntry5] : right anterior thigh painful to touch [FreeTextEntry7] : JOAQUIN [Straight-Leg Raise Test - Left] : straight leg raise of the left leg was negative [Straight-Leg Raise Test - Right] : straight leg raise  of the right leg was negative

## 2022-08-09 NOTE — END OF VISIT
[FreeTextEntry3] : I have seen the patient and reviewed the case together with PA and I agree with the final recommendations and plan of care.\par \par Thomas Lantigua MD\par Neurosurgery\par \par  [Time Spent: ___ minutes] : I have spent [unfilled] minutes of time on the encounter. [>50% of the face to face encounter time was spent on counseling and/or coordination of care for ___] : Greater than 50% of the face to face encounter time was spent on counseling and/or coordination of care for [unfilled]
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
## 2022-08-09 NOTE — DATA REVIEWED
[de-identified] : PROCEDURE: MRI LUMBAR SPINE \par \par ORDER #: [id number] \par CC: ; ; ; \par End of cc's \par \par CLINICAL HISTORY / REASON FOR EXAM: Back pain. \par \par  TECHNIQUE: MRI of the lumbar spine without IV contrast. \par  Examination consists of noncontrast sagittal and transaxial T1 and T2-weighted \par  images as well as sagittal STIR images of the lumbar spine. \par \par  COMPARISON: Lumbosacral MRI 3/10/2020 and radiographs of the lumbar spine \par  7/26/2022 \par \par  FINDINGS: \par  VERTEBRAL BODIES: Patient is again status post laminectomies from T11-L2. \par  There are stable screws extending from the left side through vertebral bodies \par  at T11 to L2 with a plate connecting screws, unchanged. Interval placement of \par  an additional left-sided screw traversing the posterior aspect of the L2/L3 \par  intervertebral disc space. Right sided pedicle screws have been placed at L2, \par  L3 and L4 are unchanged. Vertebral disc cages are redemonstrated at L2-L3 and \par  L3-L4. There remains extensive artifact from the metallic hardware which \par  limits evaluation of the intraspinal canal and foraminal contents, \par  particularly at L2/L3. There is no evidence of acute fracture. Interbody \par  fusion at multiple levels is redemonstrated. Chronic vertebral body height \par  loss at L2. \par \par  ALIGNMENT: Stable thoracolumbar kyphoscoliosis and marked retrolisthesis of L2 \par  with respect to L3. No evidence of acute traumatic malalignment on the current \par  exam. \par \par  BONE MARROW SIGNAL: No neoplastic replacement of bone marrow. No evidence of \par  osteomyelitis/discitis. \par \par  INTRADURAL SPACE: Again demonstrated is an ectatic spinal canal at all levels \par  from T11 to L3. Stable mild erosion of the dorsal aspect of L3 vertebral body \par  with fluid appearance. Previously described right of the midline in the spinal \par  canal at the T11-T12 level is a stable T2 and T1 isointense lesion likely a \par  neurofibroma in the setting of neurofibromatosis. No new or enlarging \par  neurofibroma is demonstrated. The distal end of the spinal cord is \par  unremarkable. The nerve roots are normal in signal intensity and morphology. \par \par  FINDINGS AT SPECIFIC LEVELS: \par \par  L5-S1: Mild facet osteoarthritis. No significant disc herniation or canal \par  stenosis. No significant change. \par \par  L4-L5: Mild facet osteoarthritis. No significant disc herniation or canal \par  stenosis. No significant change. \par \par  L3-L4: Artifact from the cage appears to extend into the ventral canal and \par  tiny amount of the cage likely extends into the canal which is seen on the \par  radiographs data. No significant disc herniation or canal stenosis. Limited \par  assessment of the right neural foramen due to artifact. Left appears patent. \par \par  L2-L3: Again demonstrated is moderate retrolisthesis which contributes to mild \par  to moderate canal stenosis without change. Limited assessment of the neural \par  foramen and intraspinal contents due to artifact from hardware. \par \par  L1-L2: Bone protrusion of the superior and inferior endplates of the L1 \par  vertebral body into the spinal canal which again contributes to severe spinal \par  stenosis at the level of the inferior endplate of the L1 vertebral. The \par  superior endplate of L1 there is stable compression on the thecal sac and \par  moderate stenosis. \par \par  PARASPINAL SOFT TISSUES: There is no evidence of a paraspinal mass or fluid \par  collection. Post cholecystectomy. Prominence of the common duct likely due to \par  the postcholecystectomy state. \par \par  IMPRESSION: \par  Interval surgery since 2020 with placement of a left-sided screw which now \par  traverses the posterior aspect of the L2/L3 intervertebral disc space. \par  Artifact from the hardware limits evaluation of the intraspinal and neural \par  foraminal contents. \par \par  No significant interval change otherwise. Severe canal stenosis at the L1/L2 \par  interface secondary to a bony protuberance and retrolisthesis related to \par  L2/L3, unchanged. No evidence of cauda equina compression. No endplate erosive \par  changes or marrow signal abnormality suggesting discitis osteomyelitis. Stable \par  small neural fibroma the right aspect of the canal at T11/T12. \par \par  --- End of Report --- \par \par  Electronically Signed: \par  ____________________________________________ \par  Sheila Jacksonpar  07/28/22 1247

## 2022-08-09 NOTE — ASSESSMENT
[FreeTextEntry1] : I have discussed the natural history and treatment options for chronic neuropathic pain with the patient. I explained the approved indications for spinal cord stimulation which are failed back surgery syndrome (FBSS), also known as postlaminectomy syndrome, and complex regional pain syndrome (CRPS). I specifically explained that after a successful trial with SCS, the literature is consistent with 50% chance of a 50% improvement in pain symptoms. I also explained that the patients that usually do better in terms of pain improvement are the ones with leg pain, instead of axial low back pain, in cases of FBSS. I explained the different types of neuromodulation surgical options, including percutaneous and paddle leads and the pros and cons of each option. I discussed the risks, benefits, possible complications and expected outcome related to each treatment option. The risks of surgery were discussed in detail including but not limited to postoperative infection at the surgical site, hospital acquired pneumonia, hospital acquired urinary tract infection, postoperative meningitis, wound dehiscence, CSF leak, stroke (ischemic and hemorrhagic), postoperative seizures, worsening motor function due to spinal cord or nerve injury, postoperative visual deficit which could be permanent (blindness) when surgery is performed in a prone position, cardiovascular complications (MI, PE, DVT) and I also explained that some of these complications could lead to sepsis, coma or even death. I discussed the fact that some of these complications may require subsequent surgical procedure(s) to correct them. In the end my recommendation is for SCS trial with Dr. Lacy. She will follow up with us for surgical discussion of SCS paddle implantation if she has a successful trial. The patient and her mother understand the plan of care and is in agreement.  All questions answered to patient satisfaction. \par

## 2022-08-17 ENCOUNTER — APPOINTMENT (OUTPATIENT)
Dept: PAIN MANAGEMENT | Facility: CLINIC | Age: 47
End: 2022-08-17

## 2022-08-17 VITALS
TEMPERATURE: 98 F | HEIGHT: 57 IN | BODY MASS INDEX: 23.73 KG/M2 | WEIGHT: 110 LBS | DIASTOLIC BLOOD PRESSURE: 73 MMHG | SYSTOLIC BLOOD PRESSURE: 106 MMHG

## 2022-08-17 PROCEDURE — 99214 OFFICE O/P EST MOD 30 MIN: CPT

## 2022-08-17 NOTE — HISTORY OF PRESENT ILLNESS
[9] : 3. What number best describes how, during the past week, pain has interfered with your general activity? 9/10 pain [Back Pain] : back pain [___ yrs] : [unfilled] year(s) ago [7] : an average pain level of 7/10 [10] : a maximum pain level of 10/10 [Transitioning] : transitioning [Medications] : medications [FreeTextEntry1] : Interval Note:\par \par Continues to complain of back and leg pain, worse with transition.  Pain is so bad that patient finds it difficult to perform adls and ambulate. Saw Dr. Lantigua who recommended SCS trial. Denies any additional weakness, numbness, bowel/bladder dysfunction. \par \par \par HPI\par \par Ms. MAURO SCHNEIDER is a 46 year F with pmhx of neurofibromatosis with neurofibroma sp laminectomy, decompression and fusion presents with bilateral lower back pain radiating to bilateral subchondral, and lower back pain radiating to left anterior thigh.  Pain is so bad that patient finds it difficult to perform adls and ambulate. denies any worsening numbness, weakness, bowel/bladder dysfunction.  \par \par Sensory  painful to touch\par \par Vasomotor  warmer over painful area\par \par Sudomotor/oedema  - edema\par \par Motor/trophic  decreased ROM\par \par Previous and current pain medications/doses/effects:\par \par percocet 5/325 \par Medical cannabis \par Tizanidine prn spasm\par cymbalta \par \par Previous Pain Treatments:\par \par PT without improvement\par \par Previous Pain Injections:\par \par LEFt and RIGHT L4-sacral ala mb RFA completed 12/16/21 \par BILATERAL L4-sacral ala diagnostic medial branch block 10/7/21\par   caudal epidural steroid injection 7/26/21\par \par Previous Diagnostic Studies/Images:\par \par MRI LS 7/22\par \par Patient is again status post laminectomies from T11-L2.\par  There are stable screws extending from the left side through vertebral bodies\par  at T11 to L2 with a plate connecting screws, unchanged. Interval placement of\par  an additional left-sided screw traversing the posterior aspect of the L2/L3\par  intervertebral disc space. Right sided pedicle screws have been placed at L2,\par  L3 and L4 are unchanged. Vertebral disc cages are redemonstrated at L2-L3 and\par  L3-L4. There remains extensive artifact from the metallic hardware which\par  limits evaluation of the intraspinal canal and foraminal contents,\par  particularly at L2/L3. There is no evidence of acute fracture. Interbody\par  fusion at multiple levels is redemonstrated. Chronic vertebral body height\par  loss at L2.\par \par  ALIGNMENT: Stable thoracolumbar kyphoscoliosis and marked retrolisthesis of L2\par  with respect to L3. No evidence of acute traumatic malalignment on the current\par  exam.\par \par  BONE MARROW SIGNAL: No neoplastic replacement of bone marrow. No evidence of\par  osteomyelitis/discitis.\par \par  INTRADURAL SPACE: Again demonstrated is an ectatic spinal canal at all levels\par  from T11 to L3. Stable mild erosion of the dorsal aspect of L3 vertebral body\par  with fluid appearance. Previously described right of the midline in the spinal\par  canal at the T11-T12 level is a stable T2 and T1 isointense lesion likely a\par  neurofibroma in the setting of neurofibromatosis. No new or enlarging\par  neurofibroma is demonstrated. The distal end of the spinal cord is\par  unremarkable. The nerve roots are normal in signal intensity and morphology.\par \par  FINDINGS AT SPECIFIC LEVELS:\par \par  L5-S1: Mild facet osteoarthritis. No significant disc herniation or canal\par  stenosis. No significant change.\par \par  L4-L5: Mild facet osteoarthritis. No significant disc herniation or canal\par  stenosis. No significant change.\par \par  L3-L4: Artifact from the cage appears to extend into the ventral canal and\par  tiny amount of the cage likely extends into the canal which is seen on the\par  radiographs data. No significant disc herniation or canal stenosis. Limited\par  assessment of the right neural foramen due to artifact. Left appears patent.\par \par  L2-L3: Again demonstrated is moderate retrolisthesis which contributes to mild\par  to moderate canal stenosis without change. Limited assessment of the neural\par  foramen and intraspinal contents due to artifact from hardware.\par \par  L1-L2: Bone protrusion of the superior and inferior endplates of the L1\par  vertebral body into the spinal canal which again contributes to severe spinal\par  stenosis at the level of the inferior endplate of the L1 vertebral. The\par  superior endplate of L1 there is stable compression on the thecal sac and\par  moderate stenosis.\par \par  PARASPINAL SOFT TISSUES: There is no evidence of a paraspinal mass or fluid\par  collection. Post cholecystectomy. Prominence of the common duct likely due to\par  the postcholecystectomy state.\par \par  IMPRESSION:\par  Interval surgery since 2020 with placement of a left-sided screw which now\par  traverses the posterior aspect of the L2/L3 intervertebral disc space.\par  Artifact from the hardware limits evaluation of the intraspinal and neural\par  foraminal contents.\par \par  No significant interval change otherwise. Severe canal stenosis at the L1/L2\par  interface secondary to a bony protuberance and retrolisthesis related to\par  L2/L3, unchanged. No evidence of cauda equina compression. No endplate erosive\par  changes or marrow signal abnormality suggesting discitis osteomyelitis. Stable\par  small neural fibroma the right aspect of the canal at T11/T12.\par \par \par MRI LS 3/20\par \par Patient status post laminectomies from T11-L2. Previous study \par  demonstrated screws extending from the left side through vertebral bodies at \par  T11 to L2. A plate connected the screws. That finding is again demonstrated. \par  Since the previous study, right sided pedicle screws have been placed at L2, \par  L3 and L4. Vertebral disc cages are now demonstrated at L2-L3 and L3-L4. There \par  is extensive metallic artifact from hardware. The artifact from the cage at \par  L3-L4 obscures the ventral aspect of the spinal canal. The artifact from the \par  pedicle screw or the pedicle screw at L3 appears to extend medial to the \par  pedicle into the right side of the spinal canal. Again demonstrated is grade 2 \par  retrolisthesis at L2-L3. \par \par  INTRADURAL SPACE: Again demonstrated is an ectatic spinal canal at all levels \par  from T11 to L3. Canal is wide in AP diameter as well as in width. Wide canal \par  expands neural foramina at multiple levels. Again demonstrated is erosion of \par  the dorsal aspect of the L3 vertebral body. Wide canal could represent \par  congenital dural ectasia/meningoceles and/or previous \par  surgery/pseudomeningoceles. Again demonstrated is an enhancing structure to \par  the right of the midline in the spinal canal at the T11-T12 level. On contrast \par  image 30 series 1001 of the current study, the enhancing structure measures 8 \par  mm in AP diameter by 7 mm in width. On sagittal T2-weighted image 12 series \par  501, the lesion measures 24 mm in height. The lesion has not significantly \par  changed since 2012. In view of history of neurofibromatosis indicated on the \par  report of previous study, the lesion is likely a neurofibroma. No new or \par  enlarging neurofibroma is demonstrated. The distal end of the spinal cord is \par  unremarkable. \par \par  FINDINGS AT SPECIFIC LEVELS: \par \par  L5-S1: Mild facet osteoarthritis. No bulge or herniation. No significant \par  change. \par \par  L4-L5: Mild facet osteoarthritis. No bulge or herniation. No significant \par  change. \par \par  L3-L4: As noted above, artifact from the cage appears to extend into the \par  spinal canal is possible that a small amount of the cage extends into the \par  spinal canal, but there is no evidence of disc bulge or herniation. \par \par  L2-L3: Again demonstrated is grade 2 retrolisthesis. There is mild central \par  stenosis without change. \par \par  L1 level: While the intervertebral discs at T12-L1 and L1-L2 do not \par  protrude into the spinal canal there is a bony protrusion of the superior and \par  inferior endplates of the L1 vertebral body into the spinal canal. That bony \par  protrusion was present on the previous study. However, there is now severe \par  spinal stenosis at the level of the inferior endplate of the L1 vertebral body \par  (compare sagittal images 4-9 series 501 of the current study to sagittal \par  images 7-12 series 601 of the previous study in 2012). At the level of the \par  superior endplate of L1, the right side of the thecal sac is now compressed, \par  but the left side is widely patent. \par \par  BONE MARROW SIGNAL: No neoplastic replacement of bone marrow. No evidence of \par  osteomyelitis/discitis. \par \par  ALIGNMENT: Again demonstrated is a thoracolumbar kyphoscoliosis. Again \par  demonstrated is second-degree retrolisthesis of L2 with respect to L3. \par \par  PARASPINAL SOFT TISSUES: There is no paraspinal mass. There is no paraspinal \par  neurofibroma. Small follicles in the right ovary are physiologic in a young \par  patient. There are small bilateral renal lesions that are not fully \par  characterized, but are consistent with cysts. \par \par \par  IMPRESSION: Status post additional surgery since previous study 2012. Artifact \par  from the right L3 pedicle screw or the actual pedicle screw appears to extend \par  into the spinal canal. Artifact from the disc cage at L3-L4 protrudes into the \par  spinal canal. It is possible that a small amount of the cage extends into the \par  spinal canal. \par \par  Interval development of severe spinal stenosis at the level of the inferior \par  endplate of L1. \par \par  No change in presumed neurofibroma in the right side of the spinal canal at \par  the T11-T12 level.  [FreeTextEntry2] : 27

## 2022-08-17 NOTE — PHYSICAL EXAM
[Normal] : Well developed, in no acute distress, alert and oriented to person, place and time [Normal muscle bulk without asymmetry] : normal muscle bulk without asymmetry [Facet Tenderness] : facet tenderness [] : Motor: [NL] : normal and symmetric bilaterally

## 2022-08-17 NOTE — ASSESSMENT
[FreeTextEntry1] : >> Imaging and Other Studies\par \par I personally reviewed the relevant imaging.  Discussed and explained to patient the likely source of pathology and pain.  Questions answered. MRI\par \par  \par >> Therapy and Other Modalities\par \par PT - referral provided\par continue myofascial therapy\par \par >> Medications\par  \par gabapentin TID\par cautioned change in mood.  Encouraged to call with any worsening mood or depression/suicidal ideations\par \par Trial Tizanidine prn spasm/pain\par \par I advised MAURO that the NSAID should be taken with food.  In addition while taking the prescribed NSAID, no over the counter or other NSAIDs should be used, such as ibuprofen (Motrin or Advil) or naproxen (Aleve) as this can cause stomach upset or other side effects.  If needed for fever or breakthrough pain Tylenol can be used.\par \par Regarding opiate medication to manage pain. I had a detailed discussion with the patient regarding the risks of long-term opioid use, including the potential for medication side effects, hyperaglesia, endocrine dysfunction,  Encouraged weaning with assistance of current prescriber.\par \par >> Interventions\par \par persistent pain secondary to lumbosacral stenosis and postlaminectomy pain demonstrated on imaging refractory to conservative treatments, sp caudal epidural steroid injection\par \par may consider repeat caudal epidural steroid injection\par \par neuropathic pain - possible causalgia \par \par may consider LSB for sympathetic pain \par \par Significant component of axial back pain secondary to lumbar spondylosis and facet arthropathy demonstrated on MRI LS.  Pain refractory to conservative treatments.  sp BILATERAL L4-sacral ala diagnostic medial branch block (2 joints, 3 nerves on each side) x 2\par \par Given significant relief from diagnostic lumbar medial branch block of >80%, and significant improvement in function (as measured by SANJEEV) and continued lumbar facet arthropathy pain (demonstrated on imaging) and axial back pain,sp  RIGHT then LEFT  L4-sacral ala medial branch (2 joints, 3 nerves on each side) radiofrequency ablation with significant improvement\par \par \par neuropathic pain persists despite conservative treatments and interventions\par Will schedule spinal cord stimulator trial r/b/a discussed\par \par Referral to Psych for evaluation\par \par MRI TS to evaluate for epidural patency\par \par rtc pre trial for information session with myself and rep\par \par \par \par \par >> Consults\par \par neurology - establish care for hx of neurofibromatosis\par patient will consult with PMD\par \par referral to spine for evaluation \par >> Discussion of Risks/Benefits/Alternatives\par \par \par \par 	>Regarding any scheduled procedures:\par \par I have discussed in detail with the patient that any interventional pain procedure is associated with potential risks.  The procedure may include an injection of steroids and potentially other medications (local anesthetic and normal saline) into the epidural space or surrounding tissue of the spine.  There are significant risks of this procedure which include and are not limited to infection, bleeding, worsening pain, dural puncture leading to postdural puncture headache, nerve damage, spinal cord injury, paralysis, stroke, and death.  \par \par There is a chance that the procedure does not improve their pain.  \par \par There are risks associated with the steroid being absorbed into the body systemically.  These include dysphoria, difficulty sleeping, mood swings and personality changes.  Premenopausal women may notice an irregularity in her menstrual cycle for 2-3 months following the injection.  Steroids can specifically affect patients with hypertension, diabetes, and peptic ulcers.  The procedure may cause a temporary increase in blood pressure and blood pressure, and may adversely affect a peptic ulcer.  Other, more rare complications, include avascular necrosis of joints, glaucoma and worsening of osteoporosis. \par \par I have discussed the risks of the procedure at length with the patient, and the potential benefits of pain relief.  I have offered alternatives to the procedure.  All questions were answered.  \par \par The patient expressed understanding and wishes to proceed with the procedure.\par \par 	>Regardin COVID19 Pandemic: \par \par Any planned interventional pain procedure are scheduled because further delay may cause harm or negative outcome to patient.  The goal in performing this procedure is to avoid deterioration of function, emergency room visits (which increases exposure) and reliance on opioids.  \par \par r/b/a discussed with patient, lack of evidence to conclusively determine whether pain management procedures have any positive or negative impact on the possibility of wally the virus and/or development of any sequelae. \par \par Patient counselled regarding timing steroid based intervention 2 weeks before or after COVID-19 vaccine administration to avoid any interaction or affect on efficacy of vaccination\par \par Patient demonstrates understanding\par \par Informed patient that risks associated with the COVID-19 infection.  Informed patient steps taken to limit the risks.  We are implementing safety precautions and following protocols consistent with the CDC and state recommendations. All patients and staff will be checked for fever or signs of illness upon entry to the facility. We will limit our steroid dose to the lowest effective therapeutic dose or in some cases steroids will not be injected at all. \par \par Patient agrees to proceed\par \par \par >> Conclusion\par \par \par The above diagnosis and treatment plan is medically reasonable and necessary based on the patient encounter \par There were no barriers to communication.\par Informed patient that I would be available for any additional questions.\par Patient was instructed to call with any worsening symptoms including severe pain, new numbness/weakness, or changes in the bowel/bladder function. \par Discussed role of nsaids in pain management and all relevant risks, if patient is continuing to require after 4 weeks the patient should f/u for alternative treatment. \par Instructed patient to maintain pain diary to monitor pain level, mobility, and function.\par \par

## 2022-09-05 ENCOUNTER — RESULT REVIEW (OUTPATIENT)
Age: 47
End: 2022-09-05

## 2022-09-07 ENCOUNTER — APPOINTMENT (OUTPATIENT)
Dept: PAIN MANAGEMENT | Facility: CLINIC | Age: 47
End: 2022-09-07

## 2022-09-07 VITALS
DIASTOLIC BLOOD PRESSURE: 79 MMHG | SYSTOLIC BLOOD PRESSURE: 112 MMHG | WEIGHT: 110 LBS | BODY MASS INDEX: 23.73 KG/M2 | HEIGHT: 57 IN | TEMPERATURE: 98 F

## 2022-09-07 PROCEDURE — 99214 OFFICE O/P EST MOD 30 MIN: CPT

## 2022-09-07 NOTE — PHYSICAL EXAM
[Normal muscle bulk without asymmetry] : normal muscle bulk without asymmetry [Facet Tenderness] : no facet tenderness [Normal] : Normal affect

## 2022-09-07 NOTE — ASSESSMENT
[FreeTextEntry1] : >> Imaging and Other Studies\par \par I personally reviewed the relevant imaging.  Discussed and explained to patient the likely source of pathology and pain.  Questions answered. MRI\par \par  \par >> Therapy and Other Modalities\par \par PT - referral provided\par continue myofascial therapy\par \par >> Medications\par  \par gabapentin TID\par cautioned change in mood.  Encouraged to call with any worsening mood or depression/suicidal ideations\par \par Trial Tizanidine prn spasm/pain\par \par I advised MAURO that the NSAID should be taken with food.  In addition while taking the prescribed NSAID, no over the counter or other NSAIDs should be used, such as ibuprofen (Motrin or Advil) or naproxen (Aleve) as this can cause stomach upset or other side effects.  If needed for fever or breakthrough pain Tylenol can be used.\par \par Regarding opiate medication to manage pain. I had a detailed discussion with the patient regarding the risks of long-term opioid use, including the potential for medication side effects, hyperaglesia, endocrine dysfunction,  Encouraged weaning with assistance of current prescriber.\par \par >> Interventions\par \par persistent pain secondary to lumbosacral stenosis and postlaminectomy pain demonstrated on imaging refractory to conservative treatments, sp caudal epidural steroid injection\par \par may consider repeat caudal epidural steroid injection\par \par neuropathic pain - possible causalgia \par \par may consider LSB for sympathetic pain \par \par Significant component of axial back pain secondary to lumbar spondylosis and facet arthropathy demonstrated on MRI LS.  Pain refractory to conservative treatments.  sp BILATERAL L4-sacral ala diagnostic medial branch block (2 joints, 3 nerves on each side) x 2\par \par Given significant relief from diagnostic lumbar medial branch block of >80%, and significant improvement in function (as measured by SANJEEV) and continued lumbar facet arthropathy pain (demonstrated on imaging) and axial back pain,sp  RIGHT then LEFT  L4-sacral ala medial branch (2 joints, 3 nerves on each side) radiofrequency ablation with significant improvement\par \par \par neuropathic pain persists despite conservative treatments and interventions\par Will schedule spinal cord stimulator trial r/b/a discussed\par \par Referral to Psych for evaluation\par \par rtc pre trial for information session with myself and rep\par \par \par >> Consults\par \par neurology - establish care for hx of neurofibromatosis\par patient will consult with PMD\par \par referral to spine for evaluation \par >> Discussion of Risks/Benefits/Alternatives\par \par \par \par 	>Regarding any scheduled procedures:\par \par I have discussed in detail with the patient that any interventional pain procedure is associated with potential risks.  The procedure may include an injection of steroids and potentially other medications (local anesthetic and normal saline) into the epidural space or surrounding tissue of the spine.  There are significant risks of this procedure which include and are not limited to infection, bleeding, worsening pain, dural puncture leading to postdural puncture headache, nerve damage, spinal cord injury, paralysis, stroke, and death.  \par \par There is a chance that the procedure does not improve their pain.  \par \par There are risks associated with the steroid being absorbed into the body systemically.  These include dysphoria, difficulty sleeping, mood swings and personality changes.  Premenopausal women may notice an irregularity in her menstrual cycle for 2-3 months following the injection.  Steroids can specifically affect patients with hypertension, diabetes, and peptic ulcers.  The procedure may cause a temporary increase in blood pressure and blood pressure, and may adversely affect a peptic ulcer.  Other, more rare complications, include avascular necrosis of joints, glaucoma and worsening of osteoporosis. \par \par I have discussed the risks of the procedure at length with the patient, and the potential benefits of pain relief.  I have offered alternatives to the procedure.  All questions were answered.  \par \par The patient expressed understanding and wishes to proceed with the procedure.\par \par 	>Regardin COVID19 Pandemic: \par \par Any planned interventional pain procedure are scheduled because further delay may cause harm or negative outcome to patient.  The goal in performing this procedure is to avoid deterioration of function, emergency room visits (which increases exposure) and reliance on opioids.  \par \par r/b/a discussed with patient, lack of evidence to conclusively determine whether pain management procedures have any positive or negative impact on the possibility of wally the virus and/or development of any sequelae. \par \par Patient counselled regarding timing steroid based intervention 2 weeks before or after COVID-19 vaccine administration to avoid any interaction or affect on efficacy of vaccination\par \par Patient demonstrates understanding\par \par Informed patient that risks associated with the COVID-19 infection.  Informed patient steps taken to limit the risks.  We are implementing safety precautions and following protocols consistent with the CDC and state recommendations. All patients and staff will be checked for fever or signs of illness upon entry to the facility. We will limit our steroid dose to the lowest effective therapeutic dose or in some cases steroids will not be injected at all. \par \par Patient agrees to proceed\par \par \par >> Conclusion\par \par \par The above diagnosis and treatment plan is medically reasonable and necessary based on the patient encounter \par There were no barriers to communication.\par Informed patient that I would be available for any additional questions.\par Patient was instructed to call with any worsening symptoms including severe pain, new numbness/weakness, or changes in the bowel/bladder function. \par Discussed role of nsaids in pain management and all relevant risks, if patient is continuing to require after 4 weeks the patient should f/u for alternative treatment. \par Instructed patient to maintain pain diary to monitor pain level, mobility, and function.\par \par

## 2022-09-07 NOTE — HISTORY OF PRESENT ILLNESS
[8] : 3. What number best describes how, during the past week, pain has interfered with your general activity? 8/10 pain [Back Pain] : back pain [___ yrs] : [unfilled] year(s) ago [7] : an average pain level of 7/10 [10] : a maximum pain level of 10/10 [Transitioning] : transitioning [Medications] : medications [FreeTextEntry1] : Interval Note:\par \par Continues to complain of back and leg pain, worse with transition.  Pain is so bad that patient finds it difficult to perform adls and ambulate. She continues to consider SCS trial, is pending evaluation by psychologist.\par \par \par HPI\par \par Ms. MAURO SCHNEIDER is a 47 year F with pmhx of neurofibromatosis with neurofibroma sp laminectomy, decompression and fusion presents with bilateral lower back pain radiating to bilateral subchondral, and lower back pain radiating to left anterior thigh.  Pain is so bad that patient finds it difficult to perform adls and ambulate. denies any worsening numbness, weakness, bowel/bladder dysfunction.  \par \par Sensory  painful to touch\par \par Vasomotor  warmer over painful area\par \par Sudomotor/oedema  - edema\par \par Motor/trophic  decreased ROM\par \par Previous and current pain medications/doses/effects:\par \par percocet 5/325 \par Medical cannabis \par Tizanidine prn spasm\par cymbalta \par \par Previous Pain Treatments:\par \par PT without improvement\par \par Previous Pain Injections:\par \par LEFt and RIGHT L4-sacral ala mb RFA completed 12/16/21 \par BILATERAL L4-sacral ala diagnostic medial branch block 10/7/21\par   caudal epidural steroid injection 7/26/21\par \par Previous Diagnostic Studies/Images:\par \par MRI LS 7/22\par \par Patient is again status post laminectomies from T11-L2.\par  There are stable screws extending from the left side through vertebral bodies\par  at T11 to L2 with a plate connecting screws, unchanged. Interval placement of\par  an additional left-sided screw traversing the posterior aspect of the L2/L3\par  intervertebral disc space. Right sided pedicle screws have been placed at L2,\par  L3 and L4 are unchanged. Vertebral disc cages are redemonstrated at L2-L3 and\par  L3-L4. There remains extensive artifact from the metallic hardware which\par  limits evaluation of the intraspinal canal and foraminal contents,\par  particularly at L2/L3. There is no evidence of acute fracture. Interbody\par  fusion at multiple levels is redemonstrated. Chronic vertebral body height\par  loss at L2.\par \par  ALIGNMENT: Stable thoracolumbar kyphoscoliosis and marked retrolisthesis of L2\par  with respect to L3. No evidence of acute traumatic malalignment on the current\par  exam.\par \par  BONE MARROW SIGNAL: No neoplastic replacement of bone marrow. No evidence of\par  osteomyelitis/discitis.\par \par  INTRADURAL SPACE: Again demonstrated is an ectatic spinal canal at all levels\par  from T11 to L3. Stable mild erosion of the dorsal aspect of L3 vertebral body\par  with fluid appearance. Previously described right of the midline in the spinal\par  canal at the T11-T12 level is a stable T2 and T1 isointense lesion likely a\par  neurofibroma in the setting of neurofibromatosis. No new or enlarging\par  neurofibroma is demonstrated. The distal end of the spinal cord is\par  unremarkable. The nerve roots are normal in signal intensity and morphology.\par \par  FINDINGS AT SPECIFIC LEVELS:\par \par  L5-S1: Mild facet osteoarthritis. No significant disc herniation or canal\par  stenosis. No significant change.\par \par  L4-L5: Mild facet osteoarthritis. No significant disc herniation or canal\par  stenosis. No significant change.\par \par  L3-L4: Artifact from the cage appears to extend into the ventral canal and\par  tiny amount of the cage likely extends into the canal which is seen on the\par  radiographs data. No significant disc herniation or canal stenosis. Limited\par  assessment of the right neural foramen due to artifact. Left appears patent.\par \par  L2-L3: Again demonstrated is moderate retrolisthesis which contributes to mild\par  to moderate canal stenosis without change. Limited assessment of the neural\par  foramen and intraspinal contents due to artifact from hardware.\par \par  L1-L2: Bone protrusion of the superior and inferior endplates of the L1\par  vertebral body into the spinal canal which again contributes to severe spinal\par  stenosis at the level of the inferior endplate of the L1 vertebral. The\par  superior endplate of L1 there is stable compression on the thecal sac and\par  moderate stenosis.\par \par  PARASPINAL SOFT TISSUES: There is no evidence of a paraspinal mass or fluid\par  collection. Post cholecystectomy. Prominence of the common duct likely due to\par  the postcholecystectomy state.\par \par  IMPRESSION:\par  Interval surgery since 2020 with placement of a left-sided screw which now\par  traverses the posterior aspect of the L2/L3 intervertebral disc space.\par  Artifact from the hardware limits evaluation of the intraspinal and neural\par  foraminal contents.\par \par  No significant interval change otherwise. Severe canal stenosis at the L1/L2\par  interface secondary to a bony protuberance and retrolisthesis related to\par  L2/L3, unchanged. No evidence of cauda equina compression. No endplate erosive\par  changes or marrow signal abnormality suggesting discitis osteomyelitis. Stable\par  small neural fibroma the right aspect of the canal at T11/T12.\par \par \par MRI LS 3/20\par \par Patient status post laminectomies from T11-L2. Previous study \par  demonstrated screws extending from the left side through vertebral bodies at \par  T11 to L2. A plate connected the screws. That finding is again demonstrated. \par  Since the previous study, right sided pedicle screws have been placed at L2, \par  L3 and L4. Vertebral disc cages are now demonstrated at L2-L3 and L3-L4. There \par  is extensive metallic artifact from hardware. The artifact from the cage at \par  L3-L4 obscures the ventral aspect of the spinal canal. The artifact from the \par  pedicle screw or the pedicle screw at L3 appears to extend medial to the \par  pedicle into the right side of the spinal canal. Again demonstrated is grade 2 \par  retrolisthesis at L2-L3. \par \par  INTRADURAL SPACE: Again demonstrated is an ectatic spinal canal at all levels \par  from T11 to L3. Canal is wide in AP diameter as well as in width. Wide canal \par  expands neural foramina at multiple levels. Again demonstrated is erosion of \par  the dorsal aspect of the L3 vertebral body. Wide canal could represent \par  congenital dural ectasia/meningoceles and/or previous \par  surgery/pseudomeningoceles. Again demonstrated is an enhancing structure to \par  the right of the midline in the spinal canal at the T11-T12 level. On contrast \par  image 30 series 1001 of the current study, the enhancing structure measures 8 \par  mm in AP diameter by 7 mm in width. On sagittal T2-weighted image 12 series \par  501, the lesion measures 24 mm in height. The lesion has not significantly \par  changed since 2012. In view of history of neurofibromatosis indicated on the \par  report of previous study, the lesion is likely a neurofibroma. No new or \par  enlarging neurofibroma is demonstrated. The distal end of the spinal cord is \par  unremarkable. \par \par  FINDINGS AT SPECIFIC LEVELS: \par \par  L5-S1: Mild facet osteoarthritis. No bulge or herniation. No significant \par  change. \par \par  L4-L5: Mild facet osteoarthritis. No bulge or herniation. No significant \par  change. \par \par  L3-L4: As noted above, artifact from the cage appears to extend into the \par  spinal canal is possible that a small amount of the cage extends into the \par  spinal canal, but there is no evidence of disc bulge or herniation. \par \par  L2-L3: Again demonstrated is grade 2 retrolisthesis. There is mild central \par  stenosis without change. \par \par  L1 level: While the intervertebral discs at T12-L1 and L1-L2 do not \par  protrude into the spinal canal there is a bony protrusion of the superior and \par  inferior endplates of the L1 vertebral body into the spinal canal. That bony \par  protrusion was present on the previous study. However, there is now severe \par  spinal stenosis at the level of the inferior endplate of the L1 vertebral body \par  (compare sagittal images 4-9 series 501 of the current study to sagittal \par  images 7-12 series 601 of the previous study in 2012). At the level of the \par  superior endplate of L1, the right side of the thecal sac is now compressed, \par  but the left side is widely patent. \par \par  BONE MARROW SIGNAL: No neoplastic replacement of bone marrow. No evidence of \par  osteomyelitis/discitis. \par \par  ALIGNMENT: Again demonstrated is a thoracolumbar kyphoscoliosis. Again \par  demonstrated is second-degree retrolisthesis of L2 with respect to L3. \par \par  PARASPINAL SOFT TISSUES: There is no paraspinal mass. There is no paraspinal \par  neurofibroma. Small follicles in the right ovary are physiologic in a young \par  patient. There are small bilateral renal lesions that are not fully \par  characterized, but are consistent with cysts. \par \par \par  IMPRESSION: Status post additional surgery since previous study 2012. Artifact \par  from the right L3 pedicle screw or the actual pedicle screw appears to extend \par  into the spinal canal. Artifact from the disc cage at L3-L4 protrudes into the \par  spinal canal. It is possible that a small amount of the cage extends into the \par  spinal canal. \par \par  Interval development of severe spinal stenosis at the level of the inferior \par  endplate of L1. \par \par  No change in presumed neurofibroma in the right side of the spinal canal at \par  the T11-T12 level.  [FreeTextEntry2] : 24

## 2022-09-21 ENCOUNTER — APPOINTMENT (OUTPATIENT)
Dept: PSYCHIATRY | Facility: CLINIC | Age: 47
End: 2022-09-21

## 2022-09-21 PROCEDURE — 99203 OFFICE O/P NEW LOW 30 MIN: CPT | Mod: 95

## 2022-11-10 ENCOUNTER — RESULT REVIEW (OUTPATIENT)
Age: 47
End: 2022-11-10

## 2022-11-10 ENCOUNTER — APPOINTMENT (OUTPATIENT)
Dept: PAIN MANAGEMENT | Facility: HOSPITAL | Age: 47
End: 2022-11-10

## 2022-11-10 ENCOUNTER — TRANSCRIPTION ENCOUNTER (OUTPATIENT)
Age: 47
End: 2022-11-10

## 2022-11-12 ENCOUNTER — TRANSCRIPTION ENCOUNTER (OUTPATIENT)
Age: 47
End: 2022-11-12

## 2022-11-16 ENCOUNTER — NON-APPOINTMENT (OUTPATIENT)
Age: 47
End: 2022-11-16

## 2022-11-17 ENCOUNTER — NON-APPOINTMENT (OUTPATIENT)
Age: 47
End: 2022-11-17

## 2022-11-21 ENCOUNTER — NON-APPOINTMENT (OUTPATIENT)
Age: 47
End: 2022-11-21

## 2022-11-29 ENCOUNTER — APPOINTMENT (OUTPATIENT)
Dept: NEUROLOGY | Facility: CLINIC | Age: 47
End: 2022-11-29

## 2022-11-29 VITALS
WEIGHT: 118 LBS | BODY MASS INDEX: 25.46 KG/M2 | HEIGHT: 57 IN | HEART RATE: 91 BPM | DIASTOLIC BLOOD PRESSURE: 87 MMHG | SYSTOLIC BLOOD PRESSURE: 126 MMHG

## 2022-11-29 DIAGNOSIS — E78.5 HYPERLIPIDEMIA, UNSPECIFIED: ICD-10-CM

## 2022-11-29 PROCEDURE — 99205 OFFICE O/P NEW HI 60 MIN: CPT

## 2022-11-29 RX ORDER — METHYLPREDNISOLONE 4 MG/1
4 TABLET ORAL
Qty: 1 | Refills: 0 | Status: DISCONTINUED | COMMUNITY
Start: 2022-11-17 | End: 2022-11-29

## 2022-11-29 RX ORDER — ROSUVASTATIN CALCIUM 5 MG/1
5 TABLET, FILM COATED ORAL DAILY
Qty: 30 | Refills: 0 | Status: ACTIVE | COMMUNITY
Start: 2022-11-29

## 2022-11-29 RX ORDER — TIZANIDINE 2 MG/1
2 TABLET ORAL
Qty: 90 | Refills: 1 | Status: COMPLETED | COMMUNITY
Start: 2022-11-11 | End: 2022-11-29

## 2022-11-29 RX ORDER — GABAPENTIN 100 MG/1
100 CAPSULE ORAL
Qty: 90 | Refills: 1 | Status: DISCONTINUED | COMMUNITY
Start: 2022-07-15 | End: 2022-11-29

## 2022-11-29 RX ORDER — ROSUVASTATIN CALCIUM 5 MG/1
5 TABLET, FILM COATED ORAL
Qty: 90 | Refills: 0 | Status: COMPLETED | COMMUNITY
Start: 2022-11-01

## 2022-11-29 RX ORDER — TIZANIDINE 2 MG/1
2 TABLET ORAL
Qty: 45 | Refills: 1 | Status: COMPLETED | COMMUNITY
Start: 2022-08-17 | End: 2022-11-29

## 2022-11-29 RX ORDER — DULOXETINE HYDROCHLORIDE 60 MG/1
60 CAPSULE, DELAYED RELEASE PELLETS ORAL
Qty: 180 | Refills: 0 | Status: DISCONTINUED | COMMUNITY
Start: 2022-02-17

## 2022-11-29 RX ORDER — ARIPIPRAZOLE 2 MG/1
2 TABLET ORAL
Qty: 90 | Refills: 0 | Status: DISCONTINUED | COMMUNITY
Start: 2022-07-07

## 2022-11-29 RX ORDER — ALPRAZOLAM 1 MG/1
1 TABLET ORAL
Qty: 60 | Refills: 0 | Status: DISCONTINUED | COMMUNITY
Start: 2022-11-01

## 2022-12-01 ENCOUNTER — APPOINTMENT (OUTPATIENT)
Dept: PAIN MANAGEMENT | Facility: CLINIC | Age: 47
End: 2022-12-01

## 2022-12-01 VITALS
DIASTOLIC BLOOD PRESSURE: 70 MMHG | BODY MASS INDEX: 25.46 KG/M2 | SYSTOLIC BLOOD PRESSURE: 110 MMHG | TEMPERATURE: 98 F | WEIGHT: 118 LBS | HEIGHT: 57 IN

## 2022-12-01 DIAGNOSIS — F11.90 OPIOID USE, UNSPECIFIED, UNCOMPLICATED: ICD-10-CM

## 2022-12-01 PROCEDURE — 99214 OFFICE O/P EST MOD 30 MIN: CPT

## 2022-12-01 NOTE — HISTORY OF PRESENT ILLNESS
[8] : 3. What number best describes how, during the past week, pain has interfered with your general activity? 8/10 pain [Persistent pain despite utilization of non-opioid alternative(s)] : Persistent pain despite utilization of non-opioid alternative(s) [4 - 7 Moderate Risk] : Opioid Risk Tool: 4 - 7 Moderate Risk [<50 MME/day (low risk)] : <50 MME/day (low risk) [I-Stop completed at today's visit] : I-Stop completed at today's visit [] : Constipation: Moderate [A discussion was had and/or printed educational materials were provided to the patient inclusive of safe treatment and optimization.] : A discussion was had and/or printed educational materials were provided to the patient inclusive of safe treatment and optimization. The treatment may include non-pharmacologic modalities or a combination of approaches.  We discussed risks, benefits, and alternatives associated with treatment [Back Pain] : back pain [___ yrs] : [unfilled] year(s) ago [7] : an average pain level of 7/10 [10] : a maximum pain level of 10/10 [Transitioning] : transitioning [Medications] : medications [FreeTextEntry1] : Interval Note:\par \par Ms. Eugene returns today accompanied by her mother after unsuccessful scs trial 11/10/22 and subsequent hospitalization and recovery.  Patient reports that she is participating in PT 3x/week with improvement in RLE strength.  Reports significant improvement in foot strength without foot drop but continued weakness over hip flexion.  She continues to require walker to ambulate.  Denies urinary incontinence or saddle anesthesia.  Denies any sensory deficits.  Patient did see Dr. Devlin who is assisting with neurologic care, started on amitriptyline and baclofen with some effectiveness in pain control.  She reports pain over back and right lateral thigh.  Patient is seeing her psychiatrist, but does report some down mood because of her pain and weakness.   Overall however, patient and family expresses optimism and reports that PT feels that she is making significant progress. \par \par \par HPI\par \par Ms. MAURO EUGENE is a 47 year F with pmhx of neurofibromatosis with neurofibroma sp laminectomy, decompression and fusion presents with bilateral lower back pain radiating to bilateral subchondral, and lower back pain radiating to left anterior thigh.  Pain is so bad that patient finds it difficult to perform adls and ambulate. denies any worsening numbness, weakness, bowel/bladder dysfunction.  \par \par Sensory  painful to touch\par \par Vasomotor  warmer over painful area\par \par Sudomotor/oedema  - edema\par \par Motor/trophic  decreased ROM\par \par Previous and current pain medications/doses/effects:\par \par percocet 5/325 \par Medical cannabis \par Tizanidine prn spasm\par cymbalta \par \par Previous Pain Treatments:\par \par PT without improvement\par \par Previous Pain Injections:\par \par SCS trial 11/10/22 - unsuccessful and removed\par LEFt and RIGHT L4-sacral ala mb RFA completed 12/16/21 \par BILATERAL L4-sacral ala diagnostic medial branch block 10/7/21\par   caudal epidural steroid injection 7/26/21\par \par Previous Diagnostic Studies/Images:\par \par MRI LS 7/22\par \par Patient is again status post laminectomies from T11-L2.\par  There are stable screws extending from the left side through vertebral bodies\par  at T11 to L2 with a plate connecting screws, unchanged. Interval placement of\par  an additional left-sided screw traversing the posterior aspect of the L2/L3\par  intervertebral disc space. Right sided pedicle screws have been placed at L2,\par  L3 and L4 are unchanged. Vertebral disc cages are redemonstrated at L2-L3 and\par  L3-L4. There remains extensive artifact from the metallic hardware which\par  limits evaluation of the intraspinal canal and foraminal contents,\par  particularly at L2/L3. There is no evidence of acute fracture. Interbody\par  fusion at multiple levels is redemonstrated. Chronic vertebral body height\par  loss at L2.\par \par  ALIGNMENT: Stable thoracolumbar kyphoscoliosis and marked retrolisthesis of L2\par  with respect to L3. No evidence of acute traumatic malalignment on the current\par  exam.\par \par  BONE MARROW SIGNAL: No neoplastic replacement of bone marrow. No evidence of\par  osteomyelitis/discitis.\par \par  INTRADURAL SPACE: Again demonstrated is an ectatic spinal canal at all levels\par  from T11 to L3. Stable mild erosion of the dorsal aspect of L3 vertebral body\par  with fluid appearance. Previously described right of the midline in the spinal\par  canal at the T11-T12 level is a stable T2 and T1 isointense lesion likely a\par  neurofibroma in the setting of neurofibromatosis. No new or enlarging\par  neurofibroma is demonstrated. The distal end of the spinal cord is\par  unremarkable. The nerve roots are normal in signal intensity and morphology.\par \par  FINDINGS AT SPECIFIC LEVELS:\par \par  L5-S1: Mild facet osteoarthritis. No significant disc herniation or canal\par  stenosis. No significant change.\par \par  L4-L5: Mild facet osteoarthritis. No significant disc herniation or canal\par  stenosis. No significant change.\par \par  L3-L4: Artifact from the cage appears to extend into the ventral canal and\par  tiny amount of the cage likely extends into the canal which is seen on the\par  radiographs data. No significant disc herniation or canal stenosis. Limited\par  assessment of the right neural foramen due to artifact. Left appears patent.\par \par  L2-L3: Again demonstrated is moderate retrolisthesis which contributes to mild\par  to moderate canal stenosis without change. Limited assessment of the neural\par  foramen and intraspinal contents due to artifact from hardware.\par \par  L1-L2: Bone protrusion of the superior and inferior endplates of the L1\par  vertebral body into the spinal canal which again contributes to severe spinal\par  stenosis at the level of the inferior endplate of the L1 vertebral. The\par  superior endplate of L1 there is stable compression on the thecal sac and\par  moderate stenosis.\par \par  PARASPINAL SOFT TISSUES: There is no evidence of a paraspinal mass or fluid\par  collection. Post cholecystectomy. Prominence of the common duct likely due to\par  the postcholecystectomy state.\par \par  IMPRESSION:\par  Interval surgery since 2020 with placement of a left-sided screw which now\par  traverses the posterior aspect of the L2/L3 intervertebral disc space.\par  Artifact from the hardware limits evaluation of the intraspinal and neural\par  foraminal contents.\par \par  No significant interval change otherwise. Severe canal stenosis at the L1/L2\par  interface secondary to a bony protuberance and retrolisthesis related to\par  L2/L3, unchanged. No evidence of cauda equina compression. No endplate erosive\par  changes or marrow signal abnormality suggesting discitis osteomyelitis. Stable\par  small neural fibroma the right aspect of the canal at T11/T12.\par \par \par MRI LS 3/20\par \par Patient status post laminectomies from T11-L2. Previous study \par  demonstrated screws extending from the left side through vertebral bodies at \par  T11 to L2. A plate connected the screws. That finding is again demonstrated. \par  Since the previous study, right sided pedicle screws have been placed at L2, \par  L3 and L4. Vertebral disc cages are now demonstrated at L2-L3 and L3-L4. There \par  is extensive metallic artifact from hardware. The artifact from the cage at \par  L3-L4 obscures the ventral aspect of the spinal canal. The artifact from the \par  pedicle screw or the pedicle screw at L3 appears to extend medial to the \par  pedicle into the right side of the spinal canal. Again demonstrated is grade 2 \par  retrolisthesis at L2-L3. \par \par  INTRADURAL SPACE: Again demonstrated is an ectatic spinal canal at all levels \par  from T11 to L3. Canal is wide in AP diameter as well as in width. Wide canal \par  expands neural foramina at multiple levels. Again demonstrated is erosion of \par  the dorsal aspect of the L3 vertebral body. Wide canal could represent \par  congenital dural ectasia/meningoceles and/or previous \par  surgery/pseudomeningoceles. Again demonstrated is an enhancing structure to \par  the right of the midline in the spinal canal at the T11-T12 level. On contrast \par  image 30 series 1001 of the current study, the enhancing structure measures 8 \par  mm in AP diameter by 7 mm in width. On sagittal T2-weighted image 12 series \par  501, the lesion measures 24 mm in height. The lesion has not significantly \par  changed since 2012. In view of history of neurofibromatosis indicated on the \par  report of previous study, the lesion is likely a neurofibroma. No new or \par  enlarging neurofibroma is demonstrated. The distal end of the spinal cord is \par  unremarkable. \par \par  FINDINGS AT SPECIFIC LEVELS: \par \par  L5-S1: Mild facet osteoarthritis. No bulge or herniation. No significant \par  change. \par \par  L4-L5: Mild facet osteoarthritis. No bulge or herniation. No significant \par  change. \par \par  L3-L4: As noted above, artifact from the cage appears to extend into the \par  spinal canal is possible that a small amount of the cage extends into the \par  spinal canal, but there is no evidence of disc bulge or herniation. \par \par  L2-L3: Again demonstrated is grade 2 retrolisthesis. There is mild central \par  stenosis without change. \par \par  L1 level: While the intervertebral discs at T12-L1 and L1-L2 do not \par  protrude into the spinal canal there is a bony protrusion of the superior and \par  inferior endplates of the L1 vertebral body into the spinal canal. That bony \par  protrusion was present on the previous study. However, there is now severe \par  spinal stenosis at the level of the inferior endplate of the L1 vertebral body \par  (compare sagittal images 4-9 series 501 of the current study to sagittal \par  images 7-12 series 601 of the previous study in 2012). At the level of the \par  superior endplate of L1, the right side of the thecal sac is now compressed, \par  but the left side is widely patent. \par \par  BONE MARROW SIGNAL: No neoplastic replacement of bone marrow. No evidence of \par  osteomyelitis/discitis. \par \par  ALIGNMENT: Again demonstrated is a thoracolumbar kyphoscoliosis. Again \par  demonstrated is second-degree retrolisthesis of L2 with respect to L3. \par \par  PARASPINAL SOFT TISSUES: There is no paraspinal mass. There is no paraspinal \par  neurofibroma. Small follicles in the right ovary are physiologic in a young \par  patient. There are small bilateral renal lesions that are not fully \par  characterized, but are consistent with cysts. \par \par \par  IMPRESSION: Status post additional surgery since previous study 2012. Artifact \par  from the right L3 pedicle screw or the actual pedicle screw appears to extend \par  into the spinal canal. Artifact from the disc cage at L3-L4 protrudes into the \par  spinal canal. It is possible that a small amount of the cage extends into the \par  spinal canal. \par \par  Interval development of severe spinal stenosis at the level of the inferior \par  endplate of L1. \par \par  No change in presumed neurofibroma in the right side of the spinal canal at \par  the T11-T12 level.  [FreeTextEntry2] : 24 [FreeTextEntry8] : reports daily BM with miralax [FreeTextEntry7] : I stop Ref # [id number]

## 2022-12-01 NOTE — PHYSICAL EXAM
[Normal muscle bulk without asymmetry] : normal muscle bulk without asymmetry [Facet Tenderness] : facet tenderness [Normal] : Normal affect [] : Motor: [___/5] : left ([unfilled]/5)

## 2022-12-01 NOTE — ASSESSMENT
[FreeTextEntry1] : >> Imaging and Other Studies\par \par I personally reviewed the relevant imaging.  Discussed and explained to patient the likely source of pathology and pain.  Questions answered. MRI\par \par  \par >> Therapy and Other Modalities\par \par continue PT with focus on improving RLE strength\par \par >> Medications\par  \par continue amitriptyline and baclofen\par \par given significant pain despite multiple non-opioid treatment, it is reasonable to trial percocet 5mg prn pain to optimize PT and recover - I will rx 30 daily prn supply\par \par Detailed pain plan with patient \par \par controlled substance agreement contract signed\par \par Istop checked \par \par Regarding opiate medication to manage pain. I had a detailed discussion with the patient regarding the risks of long-term opioid use, including the potential for medication side effects, hyperaglesia, endocrine dysfunction.\par \par Narcan corx provided\par \par I advised MAURO that the NSAID should be taken with food.  In addition while taking the prescribed NSAID, no over the counter or other NSAIDs should be used, such as ibuprofen (Motrin or Advil) or naproxen (Aleve) as this can cause stomach upset or other side effects.  If needed for fever or breakthrough pain Tylenol can be used.\par \par \par >> Interventions\par \par \par persistent pain secondary to lumbosacral stenosis and postlaminectomy pain demonstrated on imaging refractory to conservative treatments, sp caudal epidural steroid injection\par \par may consider repeat caudal epidural steroid injection\par \par neuropathic pain - possible causalgia \par \par may consider LSB for sympathetic pain \par \par Significant component of axial back pain secondary to lumbar spondylosis and facet arthropathy demonstrated on MRI LS.  Pain refractory to conservative treatments.  sp BILATERAL L4-sacral ala diagnostic medial branch block (2 joints, 3 nerves on each side) x 2\par \par Given significant relief from diagnostic lumbar medial branch block of >80%, and significant improvement in function (as measured by SANJEEV) and continued lumbar facet arthropathy pain (demonstrated on imaging) and axial back pain,sp  RIGHT then LEFT  L4-sacral ala medial branch (2 joints, 3 nerves on each side) radiofrequency ablation with significant improvement\par \par may consider repeat rfa\par \par neuropathic pain persists despite conservative treatments and interventions\par sp unsuccessful spinal cord stimulator trial \par \par \par \par >> Consults\par \par appreciate care by Dr. Devlin \par continue care with psychiatry\par \par encouraged to reach out to me with any concerns/questions\par  \par >> Discussion of Risks/Benefits/Alternatives\par \par \par \par 	>Regarding any scheduled procedures:\par \par I have discussed in detail with the patient that any interventional pain procedure is associated with potential risks.  The procedure may include an injection of steroids and potentially other medications (local anesthetic and normal saline) into the epidural space or surrounding tissue of the spine.  There are significant risks of this procedure which include and are not limited to infection, bleeding, worsening pain, dural puncture leading to postdural puncture headache, nerve damage, spinal cord injury, paralysis, stroke, and death.  \par \par There is a chance that the procedure does not improve their pain.  \par \par There are risks associated with the steroid being absorbed into the body systemically.  These include dysphoria, difficulty sleeping, mood swings and personality changes.  Premenopausal women may notice an irregularity in her menstrual cycle for 2-3 months following the injection.  Steroids can specifically affect patients with hypertension, diabetes, and peptic ulcers.  The procedure may cause a temporary increase in blood pressure and blood pressure, and may adversely affect a peptic ulcer.  Other, more rare complications, include avascular necrosis of joints, glaucoma and worsening of osteoporosis. \par \par I have discussed the risks of the procedure at length with the patient, and the potential benefits of pain relief.  I have offered alternatives to the procedure.  All questions were answered.  \par \par The patient expressed understanding and wishes to proceed with the procedure.\par \par 	>Regardin COVID19 Pandemic: \par \par Any planned interventional pain procedure are scheduled because further delay may cause harm or negative outcome to patient.  The goal in performing this procedure is to avoid deterioration of function, emergency room visits (which increases exposure) and reliance on opioids.  \par \par r/b/a discussed with patient, lack of evidence to conclusively determine whether pain management procedures have any positive or negative impact on the possibility of wally the virus and/or development of any sequelae. \par \par Patient counselled regarding timing steroid based intervention 2 weeks before or after COVID-19 vaccine administration to avoid any interaction or affect on efficacy of vaccination\par \par Patient demonstrates understanding\par \par Informed patient that risks associated with the COVID-19 infection.  Informed patient steps taken to limit the risks.  We are implementing safety precautions and following protocols consistent with the CDC and state recommendations. All patients and staff will be checked for fever or signs of illness upon entry to the facility. We will limit our steroid dose to the lowest effective therapeutic dose or in some cases steroids will not be injected at all. \par \par Patient agrees to proceed\par \par \par >> Conclusion\par \par \par The above diagnosis and treatment plan is medically reasonable and necessary based on the patient encounter \par There were no barriers to communication.\par Informed patient that I would be available for any additional questions.\par Patient was instructed to call with any worsening symptoms including severe pain, new numbness/weakness, or changes in the bowel/bladder function. \par Discussed role of nsaids in pain management and all relevant risks, if patient is continuing to require after 4 weeks the patient should f/u for alternative treatment. \par Instructed patient to maintain pain diary to monitor pain level, mobility, and function.\par \par

## 2022-12-05 RX ORDER — DULOXETINE HYDROCHLORIDE 60 MG/1
60 CAPSULE, DELAYED RELEASE PELLETS ORAL DAILY
Qty: 60 | Refills: 0 | Status: ACTIVE | COMMUNITY
Start: 2022-11-29

## 2022-12-05 NOTE — CONSULT LETTER
[Dear  ___] : Dear  [unfilled], [Consult Letter:] : I had the pleasure of evaluating your patient, [unfilled]. [Please see my note below.] : Please see my note below. [FreeTextEntry3] : Sincerely,\par \par Hi Devlin M.D.\par

## 2022-12-05 NOTE — PHYSICAL EXAM
[FreeTextEntry1] : Physical examination \par General: No acute distress, Awake, Alert. \par \par Mental status \par Awake, alert, and oriented to person, time and place, Normal attention span and concentration, Recent and remote memory intact, Language intact, Fund of knowledge intact. \par Cranial Nerves \par II: VFF \par III, IV, VI: PERRL, EOMI. \par V: Facial sensation is normal B/L. \par VII: Facial strength is normal B/L. \par VIII: Gross hearing is intact. \par IX, X: Palate is midline and elevates symmetrically. \par XI: Trapezius normal strength. \par XII: Tongue midline without atrophy or fasciculations. \par \par Motor exam \par BUE is 5/5 \par RLE 3+/5\par LLE 3/5 \par increased tone in the lower ext\par \par Reflexes \par diffusely brisk\par upgoing toes bilaterally\par \par Coordination \par Finger to nose: Normal. \par Heel to shin: unable\par \par Sensory \par Impaired vibration in the lower ext; impaired proprioception\par \par Gait \par spastic with walker
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
## 2022-12-05 NOTE — ASSESSMENT
[FreeTextEntry1] : Sheila Eugene is a 46 year old female with a PMH of HLD, anxiety, depression, chronic back pain, radiculopathy, neurofibromatosis (spontaneous genetic mutation, no fam hx) s/p multilevel laminectomy and 3 fusions, 13 surgeries total spine and left ankle, multiple spinal neurofibroma excisional surgeries who presents today for neuro consultation for her back pain and right leg pain.  \par \par She sees pain management specialist Dr. Sangita Lacy, attempted spine stimulator trail November 10th due to post procedure complication stimulator removed and she had weakness to right leg. She is currently participating in physical therapy with reported 30% improvement.  \par \par She currently uses medical marijuana and takes Cymbalta and tizanidine for pain management with minimal relief. \par \par Chronic pain disorder will start amitriptyline 25 mg tablet at night x 1 week and then 2 tablets at night \par Due to her neuropathic pain can start baclofen 10 mg oral tablet for muscle spams as needed for breakthrough pain relief.\par Discussed risk of serotonin syndrome.\par \par She does not tolerate Gabapentin or Lyrica.\par \par Discussed doing aggressive rehab- patient absolutely declined as she feels that emotionally she could not handle that at this point.\par continue PT.\par \par Follow-up in 3 months

## 2022-12-15 ENCOUNTER — APPOINTMENT (OUTPATIENT)
Dept: PAIN MANAGEMENT | Facility: CLINIC | Age: 47
End: 2022-12-15

## 2022-12-15 ENCOUNTER — NON-APPOINTMENT (OUTPATIENT)
Age: 47
End: 2022-12-15

## 2023-01-06 ENCOUNTER — APPOINTMENT (OUTPATIENT)
Dept: PAIN MANAGEMENT | Facility: CLINIC | Age: 48
End: 2023-01-06
Payer: MEDICARE

## 2023-01-06 VITALS
SYSTOLIC BLOOD PRESSURE: 146 MMHG | DIASTOLIC BLOOD PRESSURE: 89 MMHG | BODY MASS INDEX: 25.46 KG/M2 | HEIGHT: 57 IN | TEMPERATURE: 98 F | WEIGHT: 118 LBS

## 2023-01-06 PROCEDURE — 99214 OFFICE O/P EST MOD 30 MIN: CPT

## 2023-01-06 NOTE — HISTORY OF PRESENT ILLNESS
[8] : 3. What number best describes how, during the past week, pain has interfered with your general activity? 8/10 pain [Back Pain] : back pain [___ yrs] : [unfilled] year(s) ago [7] : an average pain level of 7/10 [10] : a maximum pain level of 10/10 [Transitioning] : transitioning [Medications] : medications [FreeTextEntry1] : Interval Note:\par \par Reports significant improvement in foot strength without foot drop and improvement in hip strength.  She reports that her strength has basically returned to baseline. Not requiring walker to ambulate at this point.   Denies urinary incontinence or saddle anesthesia.  Denies any sensory deficits.  \par \par She reports shocks and tingling when she changes positions over the RLE. Intermittent and only lasts a minute but affects her adls. \par \par Continues amitriptyline\par \par Not utilizing baclofen\par \par Patient is seeing her psychiatrist, but does report mood has overall improved as well.\par \par HPI\par \par Ms. MAURO SCHNEIDER is a 47 year F with pmhx of neurofibromatosis with neurofibroma sp laminectomy, decompression and fusion presents with bilateral lower back pain radiating to bilateral subchondral, and lower back pain radiating to left anterior thigh.  Pain is so bad that patient finds it difficult to perform adls and ambulate. denies any worsening numbness, weakness, bowel/bladder dysfunction.  \par \par Sensory  painful to touch\par \par Vasomotor  warmer over painful area\par \par Sudomotor/oedema  - edema\par \par Motor/trophic  decreased ROM\par \par Previous and current pain medications/doses/effects:\par \par percocet 5/325 \par Medical cannabis \par Tizanidine prn spasm\par cymbalta \par \par Previous Pain Treatments:\par \par PT without improvement\par \par Previous Pain Injections:\par \par SCS trial 11/10/22 - unsuccessful and removed\par LEFt and RIGHT L4-sacral ala mb RFA completed 12/16/21 \par BILATERAL L4-sacral ala diagnostic medial branch block 10/7/21\par   caudal epidural steroid injection 7/26/21\par \par Previous Diagnostic Studies/Images:\par \par MRI LS 7/22\par \par Patient is again status post laminectomies from T11-L2.\par  There are stable screws extending from the left side through vertebral bodies\par  at T11 to L2 with a plate connecting screws, unchanged. Interval placement of\par  an additional left-sided screw traversing the posterior aspect of the L2/L3\par  intervertebral disc space. Right sided pedicle screws have been placed at L2,\par  L3 and L4 are unchanged. Vertebral disc cages are redemonstrated at L2-L3 and\par  L3-L4. There remains extensive artifact from the metallic hardware which\par  limits evaluation of the intraspinal canal and foraminal contents,\par  particularly at L2/L3. There is no evidence of acute fracture. Interbody\par  fusion at multiple levels is redemonstrated. Chronic vertebral body height\par  loss at L2.\par \par  ALIGNMENT: Stable thoracolumbar kyphoscoliosis and marked retrolisthesis of L2\par  with respect to L3. No evidence of acute traumatic malalignment on the current\par  exam.\par \par  BONE MARROW SIGNAL: No neoplastic replacement of bone marrow. No evidence of\par  osteomyelitis/discitis.\par \par  INTRADURAL SPACE: Again demonstrated is an ectatic spinal canal at all levels\par  from T11 to L3. Stable mild erosion of the dorsal aspect of L3 vertebral body\par  with fluid appearance. Previously described right of the midline in the spinal\par  canal at the T11-T12 level is a stable T2 and T1 isointense lesion likely a\par  neurofibroma in the setting of neurofibromatosis. No new or enlarging\par  neurofibroma is demonstrated. The distal end of the spinal cord is\par  unremarkable. The nerve roots are normal in signal intensity and morphology.\par \par  FINDINGS AT SPECIFIC LEVELS:\par \par  L5-S1: Mild facet osteoarthritis. No significant disc herniation or canal\par  stenosis. No significant change.\par \par  L4-L5: Mild facet osteoarthritis. No significant disc herniation or canal\par  stenosis. No significant change.\par \par  L3-L4: Artifact from the cage appears to extend into the ventral canal and\par  tiny amount of the cage likely extends into the canal which is seen on the\par  radiographs data. No significant disc herniation or canal stenosis. Limited\par  assessment of the right neural foramen due to artifact. Left appears patent.\par \par  L2-L3: Again demonstrated is moderate retrolisthesis which contributes to mild\par  to moderate canal stenosis without change. Limited assessment of the neural\par  foramen and intraspinal contents due to artifact from hardware.\par \par  L1-L2: Bone protrusion of the superior and inferior endplates of the L1\par  vertebral body into the spinal canal which again contributes to severe spinal\par  stenosis at the level of the inferior endplate of the L1 vertebral. The\par  superior endplate of L1 there is stable compression on the thecal sac and\par  moderate stenosis.\par \par  PARASPINAL SOFT TISSUES: There is no evidence of a paraspinal mass or fluid\par  collection. Post cholecystectomy. Prominence of the common duct likely due to\par  the postcholecystectomy state.\par \par  IMPRESSION:\par  Interval surgery since 2020 with placement of a left-sided screw which now\par  traverses the posterior aspect of the L2/L3 intervertebral disc space.\par  Artifact from the hardware limits evaluation of the intraspinal and neural\par  foraminal contents.\par \par  No significant interval change otherwise. Severe canal stenosis at the L1/L2\par  interface secondary to a bony protuberance and retrolisthesis related to\par  L2/L3, unchanged. No evidence of cauda equina compression. No endplate erosive\par  changes or marrow signal abnormality suggesting discitis osteomyelitis. Stable\par  small neural fibroma the right aspect of the canal at T11/T12.\par \par \par MRI LS 3/20\par \par Patient status post laminectomies from T11-L2. Previous study \par  demonstrated screws extending from the left side through vertebral bodies at \par  T11 to L2. A plate connected the screws. That finding is again demonstrated. \par  Since the previous study, right sided pedicle screws have been placed at L2, \par  L3 and L4. Vertebral disc cages are now demonstrated at L2-L3 and L3-L4. There \par  is extensive metallic artifact from hardware. The artifact from the cage at \par  L3-L4 obscures the ventral aspect of the spinal canal. The artifact from the \par  pedicle screw or the pedicle screw at L3 appears to extend medial to the \par  pedicle into the right side of the spinal canal. Again demonstrated is grade 2 \par  retrolisthesis at L2-L3. \par \par  INTRADURAL SPACE: Again demonstrated is an ectatic spinal canal at all levels \par  from T11 to L3. Canal is wide in AP diameter as well as in width. Wide canal \par  expands neural foramina at multiple levels. Again demonstrated is erosion of \par  the dorsal aspect of the L3 vertebral body. Wide canal could represent \par  congenital dural ectasia/meningoceles and/or previous \par  surgery/pseudomeningoceles. Again demonstrated is an enhancing structure to \par  the right of the midline in the spinal canal at the T11-T12 level. On contrast \par  image 30 series 1001 of the current study, the enhancing structure measures 8 \par  mm in AP diameter by 7 mm in width. On sagittal T2-weighted image 12 series \par  501, the lesion measures 24 mm in height. The lesion has not significantly \par  changed since 2012. In view of history of neurofibromatosis indicated on the \par  report of previous study, the lesion is likely a neurofibroma. No new or \par  enlarging neurofibroma is demonstrated. The distal end of the spinal cord is \par  unremarkable. \par \par  FINDINGS AT SPECIFIC LEVELS: \par \par  L5-S1: Mild facet osteoarthritis. No bulge or herniation. No significant \par  change. \par \par  L4-L5: Mild facet osteoarthritis. No bulge or herniation. No significant \par  change. \par \par  L3-L4: As noted above, artifact from the cage appears to extend into the \par  spinal canal is possible that a small amount of the cage extends into the \par  spinal canal, but there is no evidence of disc bulge or herniation. \par \par  L2-L3: Again demonstrated is grade 2 retrolisthesis. There is mild central \par  stenosis without change. \par \par  L1 level: While the intervertebral discs at T12-L1 and L1-L2 do not \par  protrude into the spinal canal there is a bony protrusion of the superior and \par  inferior endplates of the L1 vertebral body into the spinal canal. That bony \par  protrusion was present on the previous study. However, there is now severe \par  spinal stenosis at the level of the inferior endplate of the L1 vertebral body \par  (compare sagittal images 4-9 series 501 of the current study to sagittal \par  images 7-12 series 601 of the previous study in 2012). At the level of the \par  superior endplate of L1, the right side of the thecal sac is now compressed, \par  but the left side is widely patent. \par \par  BONE MARROW SIGNAL: No neoplastic replacement of bone marrow. No evidence of \par  osteomyelitis/discitis. \par \par  ALIGNMENT: Again demonstrated is a thoracolumbar kyphoscoliosis. Again \par  demonstrated is second-degree retrolisthesis of L2 with respect to L3. \par \par  PARASPINAL SOFT TISSUES: There is no paraspinal mass. There is no paraspinal \par  neurofibroma. Small follicles in the right ovary are physiologic in a young \par  patient. There are small bilateral renal lesions that are not fully \par  characterized, but are consistent with cysts. \par \par \par  IMPRESSION: Status post additional surgery since previous study 2012. Artifact \par  from the right L3 pedicle screw or the actual pedicle screw appears to extend \par  into the spinal canal. Artifact from the disc cage at L3-L4 protrudes into the \par  spinal canal. It is possible that a small amount of the cage extends into the \par  spinal canal. \par \par  Interval development of severe spinal stenosis at the level of the inferior \par  endplate of L1. \par \par  No change in presumed neurofibroma in the right side of the spinal canal at \par  the T11-T12 level.  [FreeTextEntry2] : 24

## 2023-01-06 NOTE — PHYSICAL EXAM
[Normal] : Well developed, in no acute distress, alert and oriented to person, place and time [Normal muscle bulk without asymmetry] : normal muscle bulk without asymmetry [] : Motor: [NL] : Motor strength of the left lower extremity was normal [___/5] : right ([unfilled]/5)

## 2023-01-06 NOTE — ASSESSMENT
[FreeTextEntry1] : >> Imaging and Other Studies\par \par I personally reviewed the relevant imaging.  Discussed and explained to patient the likely source of pathology and pain.  Questions answered. MRI\par \par  \par >> Therapy and Other Modalities\par \par continue PT - referral provided\par \par >> Medications\par  \par continue amitriptyline\par \par Detailed pain plan with patient \par \par trial topiramate 25mg uptitrate to TID\par cautioned change in mood.  Encouraged to call with any worsening mood or depression/suicidal ideations\par \par \par >> Interventions\par \par \par persistent pain secondary to lumbosacral stenosis and postlaminectomy pain demonstrated on imaging refractory to conservative treatments, sp caudal epidural steroid injection\par \par may consider repeat caudal epidural steroid injection\par \par neuropathic pain - possible causalgia \par \par may consider LSB for sympathetic pain \par \par Significant component of axial back pain secondary to lumbar spondylosis and facet arthropathy demonstrated on MRI LS.  Pain refractory to conservative treatments.  sp BILATERAL L4-sacral ala diagnostic medial branch block (2 joints, 3 nerves on each side) x 2\par \par Given significant relief from diagnostic lumbar medial branch block of >80%, and significant improvement in function (as measured by SANJEEV) and continued lumbar facet arthropathy pain (demonstrated on imaging) and axial back pain,sp  RIGHT then LEFT  L4-sacral ala medial branch (2 joints, 3 nerves on each side) radiofrequency ablation with significant improvement\par \par may consider repeat rfa\par \par neuropathic pain persists despite conservative treatments and interventions\par sp unsuccessful spinal cord stimulator trial \par \par \par \par >> Consults\par \par appreciate care by Dr. Devlin \par continue care with psychiatry\par \par encouraged to reach out to me with any concerns/questions\par  \par >> Discussion of Risks/Benefits/Alternatives\par \par \par \par 	>Regarding any scheduled procedures:\par \par I have discussed in detail with the patient that any interventional pain procedure is associated with potential risks.  The procedure may include an injection of steroids and potentially other medications (local anesthetic and normal saline) into the epidural space or surrounding tissue of the spine.  There are significant risks of this procedure which include and are not limited to infection, bleeding, worsening pain, dural puncture leading to postdural puncture headache, nerve damage, spinal cord injury, paralysis, stroke, and death.  \par \par There is a chance that the procedure does not improve their pain.  \par \par There are risks associated with the steroid being absorbed into the body systemically.  These include dysphoria, difficulty sleeping, mood swings and personality changes.  Premenopausal women may notice an irregularity in her menstrual cycle for 2-3 months following the injection.  Steroids can specifically affect patients with hypertension, diabetes, and peptic ulcers.  The procedure may cause a temporary increase in blood pressure and blood pressure, and may adversely affect a peptic ulcer.  Other, more rare complications, include avascular necrosis of joints, glaucoma and worsening of osteoporosis. \par \par I have discussed the risks of the procedure at length with the patient, and the potential benefits of pain relief.  I have offered alternatives to the procedure.  All questions were answered.  \par \par The patient expressed understanding and wishes to proceed with the procedure.\par \par 	>Regardin COVID19 Pandemic: \par \par Any planned interventional pain procedure are scheduled because further delay may cause harm or negative outcome to patient.  The goal in performing this procedure is to avoid deterioration of function, emergency room visits (which increases exposure) and reliance on opioids.  \par \par r/b/a discussed with patient, lack of evidence to conclusively determine whether pain management procedures have any positive or negative impact on the possibility of wally the virus and/or development of any sequelae. \par \par Patient counselled regarding timing steroid based intervention 2 weeks before or after COVID-19 vaccine administration to avoid any interaction or affect on efficacy of vaccination\par \par Patient demonstrates understanding\par \par Informed patient that risks associated with the COVID-19 infection.  Informed patient steps taken to limit the risks.  We are implementing safety precautions and following protocols consistent with the CDC and state recommendations. All patients and staff will be checked for fever or signs of illness upon entry to the facility. We will limit our steroid dose to the lowest effective therapeutic dose or in some cases steroids will not be injected at all. \par \par Patient agrees to proceed\par \par \par >> Conclusion\par \par \par The above diagnosis and treatment plan is medically reasonable and necessary based on the patient encounter \par There were no barriers to communication.\par Informed patient that I would be available for any additional questions.\par Patient was instructed to call with any worsening symptoms including severe pain, new numbness/weakness, or changes in the bowel/bladder function. \par Discussed role of nsaids in pain management and all relevant risks, if patient is continuing to require after 4 weeks the patient should f/u for alternative treatment. \par Instructed patient to maintain pain diary to monitor pain level, mobility, and function.\par \par

## 2023-01-11 ENCOUNTER — NON-APPOINTMENT (OUTPATIENT)
Age: 48
End: 2023-01-11

## 2023-02-21 ENCOUNTER — APPOINTMENT (OUTPATIENT)
Dept: NEUROLOGY | Facility: CLINIC | Age: 48
End: 2023-02-21
Payer: MEDICARE

## 2023-02-21 VITALS
OXYGEN SATURATION: 98 % | HEART RATE: 103 BPM | BODY MASS INDEX: 25.46 KG/M2 | HEIGHT: 57 IN | SYSTOLIC BLOOD PRESSURE: 123 MMHG | DIASTOLIC BLOOD PRESSURE: 81 MMHG | WEIGHT: 118 LBS

## 2023-02-21 PROCEDURE — 99214 OFFICE O/P EST MOD 30 MIN: CPT

## 2023-02-21 RX ORDER — NALOXONE HYDROCHLORIDE 4 MG/.1ML
4 SPRAY NASAL
Qty: 1 | Refills: 0 | Status: DISCONTINUED | COMMUNITY
Start: 2022-11-11 | End: 2023-02-21

## 2023-02-21 RX ORDER — CHLORHEXIDINE GLUCONATE 213 G/1000ML
4 SOLUTION TOPICAL
Qty: 1 | Refills: 0 | Status: DISCONTINUED | COMMUNITY
Start: 2022-09-07 | End: 2023-02-21

## 2023-02-21 RX ORDER — TOPIRAMATE 25 MG/1
25 TABLET, FILM COATED ORAL
Qty: 90 | Refills: 1 | Status: DISCONTINUED | COMMUNITY
Start: 2023-01-06 | End: 2023-02-21

## 2023-02-21 RX ORDER — NALOXONE HYDROCHLORIDE 4 MG/.1ML
4 SPRAY NASAL
Qty: 1 | Refills: 0 | Status: DISCONTINUED | COMMUNITY
Start: 2022-12-01 | End: 2023-02-21

## 2023-02-21 RX ORDER — OXYCODONE AND ACETAMINOPHEN 5; 325 MG/1; MG/1
5-325 TABLET ORAL
Qty: 30 | Refills: 0 | Status: DISCONTINUED | COMMUNITY
Start: 2022-12-01 | End: 2023-02-21

## 2023-02-21 RX ORDER — TIZANIDINE 4 MG/1
4 TABLET ORAL 3 TIMES DAILY
Qty: 45 | Refills: 0 | Status: DISCONTINUED | COMMUNITY
Start: 2022-11-16 | End: 2023-02-21

## 2023-02-21 RX ORDER — TRAZODONE HYDROCHLORIDE 50 MG/1
50 TABLET ORAL
Qty: 30 | Refills: 0 | Status: ACTIVE | COMMUNITY
Start: 2023-02-01
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
## 2023-02-22 NOTE — ASSESSMENT
[FreeTextEntry1] : Sheila Eugene is a 47 year old female with a PMH of HLD, anxiety, depression, chronic back pain, radiculopathy, neurofibromatosis (spontaneous genetic mutation, no fam hx) s/p multilevel laminectomy and 3 fusions, 13 surgeries total spine and left ankle, multiple spinal neurofibroma excisional surgeries here for follow-up.  Overall Sheila reports improvement of strength and pain management.  \par \par \par She currently uses medical marijuana and takes Cymbalta 60 mg BID for pain management \par Continue amitriptyline 50 mg at night0\par No longer sensitive to minor stimuli over right thigh\par \par Baclofen 10 mg once a day for muscle spasms with no relief, will increase baclofen 20 mg TID \par Discussed risk of serotonin syndrome with Trazodone, Duloxetine, and Amitriptyline\par continue Physical therapy \par \par Follow-up in 3 months

## 2023-02-22 NOTE — PHYSICAL EXAM
[FreeTextEntry1] : Physical examination \par General: No acute distress, Awake, Alert. \par \par Mental status \par Awake, alert, and oriented to person, time and place, Normal attention span and concentration, Recent and remote memory intact, Language intact, Fund of knowledge intact. \par Cranial Nerves \par II: VFF \par III, IV, VI: PERRL, EOMI. \par V: Facial sensation is normal B/L. \par VII: Facial strength is normal B/L. \par VIII: Gross hearing is intact. \par IX, X: Palate is midline and elevates symmetrically. \par XI: Trapezius normal strength. \par XII: Tongue midline without atrophy or fasciculations. \par \par Motor exam \par BUE is 5/5 \par R hip flexor 4-/5\par L hip flexor 3+/5 \par knee extension/ flexion 4/5\par EHL 4/5\par increased tone in the lower ext\par \par Reflexes \par diffusely brisk\par \par \par Coordination \par Finger to nose: Normal. \par Heel to shin: slight difficulty\par \par Sensory \par Impaired vibration in the lower ext; impaired proprioception\par \par Gait \par steady gait with cane \par Romberg negative

## 2023-03-16 ENCOUNTER — APPOINTMENT (OUTPATIENT)
Dept: PAIN MANAGEMENT | Facility: CLINIC | Age: 48
End: 2023-03-16
Payer: MEDICARE

## 2023-03-16 VITALS
SYSTOLIC BLOOD PRESSURE: 121 MMHG | TEMPERATURE: 98 F | DIASTOLIC BLOOD PRESSURE: 83 MMHG | WEIGHT: 118 LBS | HEIGHT: 57 IN | BODY MASS INDEX: 25.46 KG/M2

## 2023-03-16 VITALS
WEIGHT: 125 LBS | SYSTOLIC BLOOD PRESSURE: 123 MMHG | BODY MASS INDEX: 26.97 KG/M2 | DIASTOLIC BLOOD PRESSURE: 81 MMHG | HEIGHT: 57 IN

## 2023-03-16 DIAGNOSIS — M79.18 MYALGIA, OTHER SITE: ICD-10-CM

## 2023-03-16 DIAGNOSIS — M47.817 SPONDYLOSIS W/OUT MYELOPATHY OR RADICULOPATHY, LUMBOSACRAL REGION: ICD-10-CM

## 2023-03-16 PROCEDURE — 99214 OFFICE O/P EST MOD 30 MIN: CPT

## 2023-03-16 NOTE — HISTORY OF PRESENT ILLNESS
[Back Pain] : back pain [___ yrs] : [unfilled] year(s) ago [7] : an average pain level of 7/10 [10] : a maximum pain level of 10/10 [Transitioning] : transitioning [Medications] : medications [FreeTextEntry1] : Interval Note:\par \par Patient continues to improve with pain and weakness.  At this point she and her family reports that she is back to baseline.  She is able to ambulate, minimal pain.  Continues on baclofen and amitryptaline.  She reports that she only  has some difficulty rising from squating.  She has not yet started driving but feels that it is a mental block. \par \par Patient is seeing her psychiatrist, but does report mood has overall improved as well.\par \par HPI\par \par Ms. MAURO SCHNEIDER is a 47 year F with pmhx of neurofibromatosis with neurofibroma sp laminectomy, decompression and fusion presents with bilateral lower back pain radiating to bilateral subchondral, and lower back pain radiating to left anterior thigh.  Pain is so bad that patient finds it difficult to perform adls and ambulate. denies any worsening numbness, weakness, bowel/bladder dysfunction.  \par \par Sensory  painful to touch\par \par Vasomotor  warmer over painful area\par \par Sudomotor/oedema  - edema\par \par Motor/trophic  decreased ROM\par \par Previous and current pain medications/doses/effects:\par \par percocet 5/325 \par Medical cannabis \par Tizanidine prn spasm\par cymbalta \par \par Previous Pain Treatments:\par \par PT without improvement\par \par Previous Pain Injections:\par \par SCS trial 11/10/22 - unsuccessful and removed\par LEFt and RIGHT L4-sacral ala mb RFA completed 12/16/21 \par BILATERAL L4-sacral ala diagnostic medial branch block 10/7/21\par   caudal epidural steroid injection 7/26/21\par \par Previous Diagnostic Studies/Images:\par \par MRI LS 7/22\par \par Patient is again status post laminectomies from T11-L2.\par  There are stable screws extending from the left side through vertebral bodies\par  at T11 to L2 with a plate connecting screws, unchanged. Interval placement of\par  an additional left-sided screw traversing the posterior aspect of the L2/L3\par  intervertebral disc space. Right sided pedicle screws have been placed at L2,\par  L3 and L4 are unchanged. Vertebral disc cages are redemonstrated at L2-L3 and\par  L3-L4. There remains extensive artifact from the metallic hardware which\par  limits evaluation of the intraspinal canal and foraminal contents,\par  particularly at L2/L3. There is no evidence of acute fracture. Interbody\par  fusion at multiple levels is redemonstrated. Chronic vertebral body height\par  loss at L2.\par \par  ALIGNMENT: Stable thoracolumbar kyphoscoliosis and marked retrolisthesis of L2\par  with respect to L3. No evidence of acute traumatic malalignment on the current\par  exam.\par \par  BONE MARROW SIGNAL: No neoplastic replacement of bone marrow. No evidence of\par  osteomyelitis/discitis.\par \par  INTRADURAL SPACE: Again demonstrated is an ectatic spinal canal at all levels\par  from T11 to L3. Stable mild erosion of the dorsal aspect of L3 vertebral body\par  with fluid appearance. Previously described right of the midline in the spinal\par  canal at the T11-T12 level is a stable T2 and T1 isointense lesion likely a\par  neurofibroma in the setting of neurofibromatosis. No new or enlarging\par  neurofibroma is demonstrated. The distal end of the spinal cord is\par  unremarkable. The nerve roots are normal in signal intensity and morphology.\par \par  FINDINGS AT SPECIFIC LEVELS:\par \par  L5-S1: Mild facet osteoarthritis. No significant disc herniation or canal\par  stenosis. No significant change.\par \par  L4-L5: Mild facet osteoarthritis. No significant disc herniation or canal\par  stenosis. No significant change.\par \par  L3-L4: Artifact from the cage appears to extend into the ventral canal and\par  tiny amount of the cage likely extends into the canal which is seen on the\par  radiographs data. No significant disc herniation or canal stenosis. Limited\par  assessment of the right neural foramen due to artifact. Left appears patent.\par \par  L2-L3: Again demonstrated is moderate retrolisthesis which contributes to mild\par  to moderate canal stenosis without change. Limited assessment of the neural\par  foramen and intraspinal contents due to artifact from hardware.\par \par  L1-L2: Bone protrusion of the superior and inferior endplates of the L1\par  vertebral body into the spinal canal which again contributes to severe spinal\par  stenosis at the level of the inferior endplate of the L1 vertebral. The\par  superior endplate of L1 there is stable compression on the thecal sac and\par  moderate stenosis.\par \par  PARASPINAL SOFT TISSUES: There is no evidence of a paraspinal mass or fluid\par  collection. Post cholecystectomy. Prominence of the common duct likely due to\par  the postcholecystectomy state.\par \par  IMPRESSION:\par  Interval surgery since 2020 with placement of a left-sided screw which now\par  traverses the posterior aspect of the L2/L3 intervertebral disc space.\par  Artifact from the hardware limits evaluation of the intraspinal and neural\par  foraminal contents.\par \par  No significant interval change otherwise. Severe canal stenosis at the L1/L2\par  interface secondary to a bony protuberance and retrolisthesis related to\par  L2/L3, unchanged. No evidence of cauda equina compression. No endplate erosive\par  changes or marrow signal abnormality suggesting discitis osteomyelitis. Stable\par  small neural fibroma the right aspect of the canal at T11/T12.\par \par \par MRI LS 3/20\par \par Patient status post laminectomies from T11-L2. Previous study \par  demonstrated screws extending from the left side through vertebral bodies at \par  T11 to L2. A plate connected the screws. That finding is again demonstrated. \par  Since the previous study, right sided pedicle screws have been placed at L2, \par  L3 and L4. Vertebral disc cages are now demonstrated at L2-L3 and L3-L4. There \par  is extensive metallic artifact from hardware. The artifact from the cage at \par  L3-L4 obscures the ventral aspect of the spinal canal. The artifact from the \par  pedicle screw or the pedicle screw at L3 appears to extend medial to the \par  pedicle into the right side of the spinal canal. Again demonstrated is grade 2 \par  retrolisthesis at L2-L3. \par \par  INTRADURAL SPACE: Again demonstrated is an ectatic spinal canal at all levels \par  from T11 to L3. Canal is wide in AP diameter as well as in width. Wide canal \par  expands neural foramina at multiple levels. Again demonstrated is erosion of \par  the dorsal aspect of the L3 vertebral body. Wide canal could represent \par  congenital dural ectasia/meningoceles and/or previous \par  surgery/pseudomeningoceles. Again demonstrated is an enhancing structure to \par  the right of the midline in the spinal canal at the T11-T12 level. On contrast \par  image 30 series 1001 of the current study, the enhancing structure measures 8 \par  mm in AP diameter by 7 mm in width. On sagittal T2-weighted image 12 series \par  501, the lesion measures 24 mm in height. The lesion has not significantly \par  changed since 2012. In view of history of neurofibromatosis indicated on the \par  report of previous study, the lesion is likely a neurofibroma. No new or \par  enlarging neurofibroma is demonstrated. The distal end of the spinal cord is \par  unremarkable. \par \par  FINDINGS AT SPECIFIC LEVELS: \par \par  L5-S1: Mild facet osteoarthritis. No bulge or herniation. No significant \par  change. \par \par  L4-L5: Mild facet osteoarthritis. No bulge or herniation. No significant \par  change. \par \par  L3-L4: As noted above, artifact from the cage appears to extend into the \par  spinal canal is possible that a small amount of the cage extends into the \par  spinal canal, but there is no evidence of disc bulge or herniation. \par \par  L2-L3: Again demonstrated is grade 2 retrolisthesis. There is mild central \par  stenosis without change. \par \par  L1 level: While the intervertebral discs at T12-L1 and L1-L2 do not \par  protrude into the spinal canal there is a bony protrusion of the superior and \par  inferior endplates of the L1 vertebral body into the spinal canal. That bony \par  protrusion was present on the previous study. However, there is now severe \par  spinal stenosis at the level of the inferior endplate of the L1 vertebral body \par  (compare sagittal images 4-9 series 501 of the current study to sagittal \par  images 7-12 series 601 of the previous study in 2012). At the level of the \par  superior endplate of L1, the right side of the thecal sac is now compressed, \par  but the left side is widely patent. \par \par  BONE MARROW SIGNAL: No neoplastic replacement of bone marrow. No evidence of \par  osteomyelitis/discitis. \par \par  ALIGNMENT: Again demonstrated is a thoracolumbar kyphoscoliosis. Again \par  demonstrated is second-degree retrolisthesis of L2 with respect to L3. \par \par  PARASPINAL SOFT TISSUES: There is no paraspinal mass. There is no paraspinal \par  neurofibroma. Small follicles in the right ovary are physiologic in a young \par  patient. There are small bilateral renal lesions that are not fully \par  characterized, but are consistent with cysts. \par \par \par  IMPRESSION: Status post additional surgery since previous study 2012. Artifact \par  from the right L3 pedicle screw or the actual pedicle screw appears to extend \par  into the spinal canal. Artifact from the disc cage at L3-L4 protrudes into the \par  spinal canal. It is possible that a small amount of the cage extends into the \par  spinal canal. \par \par  Interval development of severe spinal stenosis at the level of the inferior \par  endplate of L1. \par \par  No change in presumed neurofibroma in the right side of the spinal canal at \par  the T11-T12 level.  [FreeTextEntry2] : 21

## 2023-03-16 NOTE — ASSESSMENT
[FreeTextEntry1] : >> Imaging and Other Studies\par \par I personally reviewed the relevant imaging.  Discussed and explained to patient the likely source of pathology and pain.  Questions answered. MRI\par \par  \par >> Therapy and Other Modalities\par \par continue PT - referral provided\par \par >> Medications\par  \par continue amitriptyline\par \par Baclofen 10mg BID\par \par \par >> Interventions\par \par \par persistent pain secondary to lumbosacral stenosis and postlaminectomy pain demonstrated on imaging refractory to conservative treatments, sp caudal epidural steroid injection\par \par may consider repeat caudal epidural steroid injection\par \par neuropathic pain - possible causalgia \par \par may consider LSB for sympathetic pain \par \par Significant component of axial back pain secondary to lumbar spondylosis and facet arthropathy demonstrated on MRI LS.  Pain refractory to conservative treatments.  sp BILATERAL L4-sacral ala diagnostic medial branch block (2 joints, 3 nerves on each side) x 2\par \par Given significant relief from diagnostic lumbar medial branch block of >80%, and significant improvement in function (as measured by SANJEEV) and continued lumbar facet arthropathy pain (demonstrated on imaging) and axial back pain,sp  RIGHT then LEFT  L4-sacral ala medial branch (2 joints, 3 nerves on each side) radiofrequency ablation with significant improvement\par \par may consider repeat rfa\par \par neuropathic pain persists despite conservative treatments and interventions\par sp unsuccessful spinal cord stimulator trial \par \par \par \par >> Consults\par \par appreciate care by Dr. Devlin \par continue care with psychiatry\par \par encouraged to reach out to me with any concerns/questions\par  \par >> Discussion of Risks/Benefits/Alternatives\par \par \par \par 	>Regarding any scheduled procedures:\par \par I have discussed in detail with the patient that any interventional pain procedure is associated with potential risks.  The procedure may include an injection of steroids and potentially other medications (local anesthetic and normal saline) into the epidural space or surrounding tissue of the spine.  There are significant risks of this procedure which include and are not limited to infection, bleeding, worsening pain, dural puncture leading to postdural puncture headache, nerve damage, spinal cord injury, paralysis, stroke, and death.  \par \par There is a chance that the procedure does not improve their pain.  \par \par There are risks associated with the steroid being absorbed into the body systemically.  These include dysphoria, difficulty sleeping, mood swings and personality changes.  Premenopausal women may notice an irregularity in her menstrual cycle for 2-3 months following the injection.  Steroids can specifically affect patients with hypertension, diabetes, and peptic ulcers.  The procedure may cause a temporary increase in blood pressure and blood pressure, and may adversely affect a peptic ulcer.  Other, more rare complications, include avascular necrosis of joints, glaucoma and worsening of osteoporosis. \par \par I have discussed the risks of the procedure at length with the patient, and the potential benefits of pain relief.  I have offered alternatives to the procedure.  All questions were answered.  \par \par The patient expressed understanding and wishes to proceed with the procedure.\par \par 	>Regardin COVID19 Pandemic: \par \par Any planned interventional pain procedure are scheduled because further delay may cause harm or negative outcome to patient.  The goal in performing this procedure is to avoid deterioration of function, emergency room visits (which increases exposure) and reliance on opioids.  \par \par r/b/a discussed with patient, lack of evidence to conclusively determine whether pain management procedures have any positive or negative impact on the possibility of wally the virus and/or development of any sequelae. \par \par Patient counselled regarding timing steroid based intervention 2 weeks before or after COVID-19 vaccine administration to avoid any interaction or affect on efficacy of vaccination\par \par Patient demonstrates understanding\par \par Informed patient that risks associated with the COVID-19 infection.  Informed patient steps taken to limit the risks.  We are implementing safety precautions and following protocols consistent with the CDC and state recommendations. All patients and staff will be checked for fever or signs of illness upon entry to the facility. We will limit our steroid dose to the lowest effective therapeutic dose or in some cases steroids will not be injected at all. \par \par Patient agrees to proceed\par \par \par >> Conclusion\par \par \par The above diagnosis and treatment plan is medically reasonable and necessary based on the patient encounter \par There were no barriers to communication.\par Informed patient that I would be available for any additional questions.\par Patient was instructed to call with any worsening symptoms including severe pain, new numbness/weakness, or changes in the bowel/bladder function. \par Discussed role of nsaids in pain management and all relevant risks, if patient is continuing to require after 4 weeks the patient should f/u for alternative treatment. \par Instructed patient to maintain pain diary to monitor pain level, mobility, and function.\par \par

## 2023-04-17 DIAGNOSIS — M96.1 POSTLAMINECTOMY SYNDROME, NOT ELSEWHERE CLASSIFIED: ICD-10-CM

## 2023-05-04 ENCOUNTER — TRANSCRIPTION ENCOUNTER (OUTPATIENT)
Age: 48
End: 2023-05-04

## 2023-06-06 ENCOUNTER — APPOINTMENT (OUTPATIENT)
Dept: NEUROLOGY | Facility: CLINIC | Age: 48
End: 2023-06-06
Payer: MEDICARE

## 2023-06-06 VITALS
SYSTOLIC BLOOD PRESSURE: 120 MMHG | HEART RATE: 83 BPM | BODY MASS INDEX: 26.97 KG/M2 | WEIGHT: 125 LBS | OXYGEN SATURATION: 98 % | DIASTOLIC BLOOD PRESSURE: 78 MMHG | HEIGHT: 57 IN

## 2023-06-06 DIAGNOSIS — M79.2 NEURALGIA AND NEURITIS, UNSPECIFIED: ICD-10-CM

## 2023-06-06 DIAGNOSIS — G89.4 CHRONIC PAIN SYNDROME: ICD-10-CM

## 2023-06-06 DIAGNOSIS — Q85.00 NEUROFIBROMATOSIS, UNSPECIFIED: ICD-10-CM

## 2023-06-06 DIAGNOSIS — M62.838 OTHER MUSCLE SPASM: ICD-10-CM

## 2023-06-06 PROCEDURE — 99215 OFFICE O/P EST HI 40 MIN: CPT

## 2023-06-06 RX ORDER — BACLOFEN 20 MG/1
20 TABLET ORAL 3 TIMES DAILY
Qty: 270 | Refills: 1 | Status: DISCONTINUED | COMMUNITY
Start: 2022-11-29 | End: 2023-06-06

## 2023-06-06 RX ORDER — CARISOPRODOL 250 MG/1
250 TABLET ORAL
Refills: 0 | Status: ACTIVE | COMMUNITY

## 2023-06-06 RX ORDER — AMITRIPTYLINE HYDROCHLORIDE 50 MG/1
50 TABLET, FILM COATED ORAL
Qty: 30 | Refills: 2 | Status: COMPLETED | COMMUNITY
Start: 2022-11-29 | End: 2023-06-06

## 2023-06-06 RX ORDER — ALPRAZOLAM 1 MG/1
1 TABLET ORAL
Refills: 0 | Status: ACTIVE | COMMUNITY

## 2023-06-12 PROBLEM — M62.838 MUSCLE SPASM: Status: ACTIVE | Noted: 2023-02-22

## 2023-06-12 PROBLEM — M79.2 NEUROPATHIC PAIN: Status: ACTIVE | Noted: 2021-07-09

## 2023-06-12 PROBLEM — Q85.00 NEUROFIBROMATOSIS: Status: ACTIVE | Noted: 2021-07-09

## 2023-06-12 PROBLEM — G89.4 CHRONIC PAIN DISORDER: Status: ACTIVE | Noted: 2021-07-09

## 2023-06-12 NOTE — PHYSICAL EXAM
[FreeTextEntry1] : Physical examination \par General: No acute distress, Awake, Alert. \par \par Mental status \par Awake, alert, and oriented to person, time and place, Normal attention span and concentration, Recent and remote memory intact, Language intact, Fund of knowledge intact. \par Cranial Nerves \par II: VFF \par III, IV, VI: PERRL, EOMI. \par V: Facial sensation is normal B/L. \par VII: Facial strength is normal B/L. \par VIII: Gross hearing is intact. \par IX, X: Palate is midline and elevates symmetrically. \par XI: Trapezius normal strength. \par XII: Tongue midline without atrophy or fasciculations. \par \par Motor exam \par BUE is 5/5 \par R hip flexor 4+/5\par L hip flexor 4/5 \par knee extension/ flexion 4/5\par EHL 4/5\par increased tone in the lower ext\par \par Reflexes \par diffusely brisk\par \par \par Coordination \par Finger to nose: Normal. \par Heel to shin: slight difficulty\par \par Sensory \par Impaired vibration in the lower ext; impaired proprioception\par \par Gait \par steady gait with cane \par \par Ext- large neurofibroma on the left heel - very tender to touch 8

## 2023-06-12 NOTE — ASSESSMENT
[FreeTextEntry1] : Sheila Eugene is a 47 year old female with a PMH of HLD, anxiety, depression, chronic back pain, radiculopathy, neurofibromatosis (spontaneous genetic mutation, no fam hx) s/p multilevel laminectomy and 3 fusions, 13 surgeries total spine and left ankle, multiple spinal neurofibroma excisional surgeries here for follow-up.  Overall Sheila reports improvement of strength and pain management.  \par \par She is very frustrated at lack of improvement and plateauing with PT. \par \par She currently uses medical marijuana and takes Cymbalta 60 mg BID for pain management \par She stopped Amitriptyline and Baclofen.\par No longer sensitive to minor stimuli over right thigh\par \par I recommend she continue PT.\par \par Recommend seeing someone for excision of neurofibroma at \[facility]\"".\par \par f/u with me as needed for now (I am not actively treating any pain and for now she does not want to try anything else). \par \par
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
